# Patient Record
Sex: MALE | Race: WHITE | URBAN - METROPOLITAN AREA
[De-identification: names, ages, dates, MRNs, and addresses within clinical notes are randomized per-mention and may not be internally consistent; named-entity substitution may affect disease eponyms.]

---

## 2018-04-18 ENCOUNTER — TELEPHONE (OUTPATIENT)
Dept: PULMONOLOGY | Facility: CLINIC | Age: 48
End: 2018-04-18

## 2018-04-18 ENCOUNTER — OFFICE VISIT (OUTPATIENT)
Dept: PULMONOLOGY | Facility: CLINIC | Age: 48
End: 2018-04-18

## 2018-04-18 VITALS
TEMPERATURE: 98 F | SYSTOLIC BLOOD PRESSURE: 117 MMHG | DIASTOLIC BLOOD PRESSURE: 81 MMHG | HEART RATE: 66 BPM | OXYGEN SATURATION: 97 % | HEIGHT: 74 IN | BODY MASS INDEX: 26.18 KG/M2 | WEIGHT: 204 LBS

## 2018-04-18 DIAGNOSIS — J45.20 MILD INTERMITTENT ASTHMA WITHOUT COMPLICATION: Primary | Chronic | ICD-10-CM

## 2018-04-18 PROCEDURE — 99204 OFFICE O/P NEW MOD 45 MIN: CPT | Performed by: INTERNAL MEDICINE

## 2018-04-18 PROCEDURE — 99212 OFFICE O/P EST SF 10 MIN: CPT | Performed by: INTERNAL MEDICINE

## 2018-04-18 RX ORDER — ALBUTEROL SULFATE 90 UG/1
2 AEROSOL, METERED RESPIRATORY (INHALATION) EVERY 6 HOURS PRN
Qty: 1 INHALER | Refills: 5 | Status: SHIPPED | OUTPATIENT
Start: 2018-04-18

## 2018-04-18 NOTE — PROGRESS NOTES
Pulmonary Consult Note    HPI:   Rafaela Winter is a 57year old male with Patient presents with:  Asthma: consult ,establish care     No primary care provider on file.     Asthma  Pj Labor    Dx asthma 2 yrs  No wheeze during childhood  Dx with after \"h asthma without complication  (primary encounter diagnosis)  Pt is a large loss insurance  with dx of asthma 2 yrs ago  He has no sx but dx with asthma presumably by PFTs or arik  His only \"sx\" are his wife states he breaths heavy  He has be

## 2018-05-29 ENCOUNTER — TELEPHONE (OUTPATIENT)
Dept: PULMONOLOGY | Facility: CLINIC | Age: 48
End: 2018-05-29

## 2018-10-31 ENCOUNTER — OFFICE VISIT (OUTPATIENT)
Dept: PULMONOLOGY | Facility: CLINIC | Age: 48
End: 2018-10-31
Payer: COMMERCIAL

## 2018-10-31 VITALS
HEART RATE: 75 BPM | DIASTOLIC BLOOD PRESSURE: 89 MMHG | OXYGEN SATURATION: 99 % | RESPIRATION RATE: 18 BRPM | SYSTOLIC BLOOD PRESSURE: 131 MMHG | WEIGHT: 206 LBS | HEIGHT: 74 IN | BODY MASS INDEX: 26.44 KG/M2

## 2018-10-31 DIAGNOSIS — Z23 NEED FOR VACCINATION: Primary | ICD-10-CM

## 2018-10-31 DIAGNOSIS — J45.20 MILD INTERMITTENT ASTHMA WITHOUT COMPLICATION: Chronic | ICD-10-CM

## 2018-10-31 PROCEDURE — 90471 IMMUNIZATION ADMIN: CPT | Performed by: INTERNAL MEDICINE

## 2018-10-31 PROCEDURE — 99212 OFFICE O/P EST SF 10 MIN: CPT | Performed by: INTERNAL MEDICINE

## 2018-10-31 PROCEDURE — 99214 OFFICE O/P EST MOD 30 MIN: CPT | Performed by: INTERNAL MEDICINE

## 2018-10-31 PROCEDURE — 94010 BREATHING CAPACITY TEST: CPT | Performed by: INTERNAL MEDICINE

## 2018-10-31 PROCEDURE — 90686 IIV4 VACC NO PRSV 0.5 ML IM: CPT | Performed by: INTERNAL MEDICINE

## 2018-10-31 NOTE — PROGRESS NOTES
Pulmonary Follow Up Note    HPI:   Edd Nunez is a 57year old male with Patient presents with: Follow - Up    No primary care provider on file.     No wheeze or sob or cough  He states wife states he has \"heavy breathing\"  Still using Arunity  Has AT/NC  Anicteric  Lung CTA  CV  Reg Nl S1S2  Abd soft NT/ND  Ext No edema  Skin No rash  Psych Normal mood           PFT 2016 mod obstruction   Nl vol   Nl DLCO   _ BD    CT scan with apical scaring, airways inflammation    ASSESSMENT/PLAN:     (Z23) Need

## 2019-02-06 ENCOUNTER — OFFICE VISIT (OUTPATIENT)
Dept: PULMONOLOGY | Facility: CLINIC | Age: 49
End: 2019-02-06
Payer: COMMERCIAL

## 2019-02-06 VITALS
WEIGHT: 210 LBS | HEART RATE: 76 BPM | BODY MASS INDEX: 26.95 KG/M2 | DIASTOLIC BLOOD PRESSURE: 85 MMHG | SYSTOLIC BLOOD PRESSURE: 136 MMHG | HEIGHT: 74 IN | OXYGEN SATURATION: 100 %

## 2019-02-06 DIAGNOSIS — J45.20 MILD INTERMITTENT ASTHMA WITHOUT COMPLICATION: Primary | Chronic | ICD-10-CM

## 2019-02-06 PROCEDURE — 99213 OFFICE O/P EST LOW 20 MIN: CPT | Performed by: INTERNAL MEDICINE

## 2019-02-06 PROCEDURE — 99212 OFFICE O/P EST SF 10 MIN: CPT | Performed by: INTERNAL MEDICINE

## 2019-02-06 RX ORDER — BENZONATATE 200 MG/1
200 CAPSULE ORAL 3 TIMES DAILY PRN
Qty: 45 CAPSULE | Refills: 3 | Status: SHIPPED | OUTPATIENT
Start: 2019-02-06 | End: 2019-02-06 | Stop reason: CLARIF

## 2019-02-06 NOTE — PROGRESS NOTES
Pulmonary Follow Up Note    HPI:   Apryl Reinoso is a 54year old male with Patient presents with:  Asthma: follow up ,patientstates he is feeling well today ,denies any episodes ,has not used his inhaler since his last visit     No primary care provider fibrosis       ASSESSMENT/PLAN:     .(J45.20) Mild intermittent asthma without complication  (primary encounter diagnosis)  Doing well  Last arik with some improvement in FEV1  No sx now on prn alb MDI  Plan: cont alb MDI prn   Yearly flu vax   N95 mask a

## 2019-04-04 ENCOUNTER — OFFICE VISIT (OUTPATIENT)
Dept: INTERNAL MEDICINE CLINIC | Facility: CLINIC | Age: 49
End: 2019-04-04
Payer: COMMERCIAL

## 2019-04-04 ENCOUNTER — LAB ENCOUNTER (OUTPATIENT)
Dept: LAB | Age: 49
End: 2019-04-04
Attending: INTERNAL MEDICINE
Payer: COMMERCIAL

## 2019-04-04 VITALS
SYSTOLIC BLOOD PRESSURE: 130 MMHG | HEART RATE: 71 BPM | WEIGHT: 210 LBS | DIASTOLIC BLOOD PRESSURE: 88 MMHG | TEMPERATURE: 98 F | BODY MASS INDEX: 26.95 KG/M2 | OXYGEN SATURATION: 99 % | HEIGHT: 74.2 IN

## 2019-04-04 DIAGNOSIS — G89.29 CHRONIC RIGHT SHOULDER PAIN: ICD-10-CM

## 2019-04-04 DIAGNOSIS — M25.562 CHRONIC PAIN OF BOTH KNEES: ICD-10-CM

## 2019-04-04 DIAGNOSIS — J45.20 MILD INTERMITTENT ASTHMA WITHOUT COMPLICATION: Chronic | ICD-10-CM

## 2019-04-04 DIAGNOSIS — G89.29 CHRONIC PAIN OF BOTH KNEES: ICD-10-CM

## 2019-04-04 DIAGNOSIS — M25.511 CHRONIC RIGHT SHOULDER PAIN: ICD-10-CM

## 2019-04-04 DIAGNOSIS — Z00.00 PHYSICAL EXAM: Primary | ICD-10-CM

## 2019-04-04 DIAGNOSIS — Z00.00 PHYSICAL EXAM: ICD-10-CM

## 2019-04-04 DIAGNOSIS — M25.561 CHRONIC PAIN OF BOTH KNEES: ICD-10-CM

## 2019-04-04 PROCEDURE — 36415 COLL VENOUS BLD VENIPUNCTURE: CPT

## 2019-04-04 PROCEDURE — 80061 LIPID PANEL: CPT

## 2019-04-04 PROCEDURE — 85025 COMPLETE CBC W/AUTO DIFF WBC: CPT

## 2019-04-04 PROCEDURE — 99386 PREV VISIT NEW AGE 40-64: CPT | Performed by: INTERNAL MEDICINE

## 2019-04-04 PROCEDURE — 80053 COMPREHEN METABOLIC PANEL: CPT

## 2019-04-04 PROCEDURE — 84443 ASSAY THYROID STIM HORMONE: CPT

## 2019-04-04 NOTE — PROGRESS NOTES
HPI:    Patient ID: Angel Mcnulty is a 54year old male. Presents to office for the first time to establish care. C/o worsening bilateral knee pain  Denies any trauma  Notes occasional right shoulder pain.   Again denies any specific episodes of traum environmental allergies and food allergies. Neurological: Negative for dizziness, seizures, light-headedness and headaches. Psychiatric/Behavioral: Negative for agitation, confusion and sleep disturbance. The patient is not nervous/anxious. not cause pain. Bilateral knees do not reveal any effusion. Again he has full range of motion without any pain elicited. Posterior/anterior drawer test negative bilaterally. There does not appear to be any joint instability.    Neurological: He is tova

## 2019-08-16 ENCOUNTER — PATIENT MESSAGE (OUTPATIENT)
Dept: INTERNAL MEDICINE CLINIC | Facility: CLINIC | Age: 49
End: 2019-08-16

## 2019-08-16 DIAGNOSIS — G89.29 CHRONIC RIGHT SHOULDER PAIN: ICD-10-CM

## 2019-08-16 DIAGNOSIS — M25.561 CHRONIC PAIN OF BOTH KNEES: Primary | ICD-10-CM

## 2019-08-16 DIAGNOSIS — M25.562 CHRONIC PAIN OF BOTH KNEES: Primary | ICD-10-CM

## 2019-08-16 DIAGNOSIS — M25.511 CHRONIC RIGHT SHOULDER PAIN: ICD-10-CM

## 2019-08-16 DIAGNOSIS — G89.29 CHRONIC PAIN OF BOTH KNEES: Primary | ICD-10-CM

## 2019-08-16 NOTE — TELEPHONE ENCOUNTER
From: Vasile Trinidad  To: Garland Hodgkin, MD  Sent: 8/16/2019 2:03 PM CDT  Subject: Referral Request    Dr. Norris Files,    I need an updated referral - I had waited to school started to schedule the physical therapy and was advised the referral is no longer

## 2019-08-26 ENCOUNTER — OFFICE VISIT (OUTPATIENT)
Dept: PHYSICAL THERAPY | Age: 49
End: 2019-08-26
Attending: INTERNAL MEDICINE
Payer: COMMERCIAL

## 2019-08-26 DIAGNOSIS — G89.29 CHRONIC RIGHT SHOULDER PAIN: ICD-10-CM

## 2019-08-26 DIAGNOSIS — M25.511 CHRONIC RIGHT SHOULDER PAIN: ICD-10-CM

## 2019-08-26 DIAGNOSIS — M25.562 CHRONIC PAIN OF BOTH KNEES: ICD-10-CM

## 2019-08-26 DIAGNOSIS — G89.29 CHRONIC PAIN OF BOTH KNEES: ICD-10-CM

## 2019-08-26 DIAGNOSIS — M25.561 CHRONIC PAIN OF BOTH KNEES: ICD-10-CM

## 2019-08-26 PROCEDURE — 97110 THERAPEUTIC EXERCISES: CPT

## 2019-08-26 PROCEDURE — 97161 PT EVAL LOW COMPLEX 20 MIN: CPT

## 2019-08-26 NOTE — PROGRESS NOTES
LOWER/UPPER EXTREMITY EVALUATION:   Referring Physician: Dr. Dolly Rodriguez  Diagnosis:   Chronic pain of both knees (M25.561,M25.562,G89.29)  Chronic right shoulder pain (M25.511,G89.29)   Date of Service: 8/26/2019     PATIENT SUMMARY   Rijeremías Carmelina poole 54 Significant findings include none    ASSESSMENT  Cecily Chrales presents to physical therapy evaluation with primary c/o B knee pain and R shoulder pain (now resolved).  The results of the objective tests and measures show decreased LE flexibility, tightness in B IT Response:   Pt education was provided on exam findings, treatment diagnosis, treatment plan, expectations, and prognosis. Pt was also provided recommendations for postural corrections and ergonomics.   Patient was instructed in and issued a HEP for: [facility] [phone]    Sincerely,  Electronically signed by therapist: Chester Olmedo, PT  [de-identified] certification required: Yes  I certify the need for these services furnished under this plan of treatment and while under my care.     X_________________________

## 2019-09-04 ENCOUNTER — OFFICE VISIT (OUTPATIENT)
Dept: PHYSICAL THERAPY | Age: 49
End: 2019-09-04
Attending: INTERNAL MEDICINE
Payer: COMMERCIAL

## 2019-09-04 DIAGNOSIS — M25.511 CHRONIC RIGHT SHOULDER PAIN: ICD-10-CM

## 2019-09-04 DIAGNOSIS — M25.562 CHRONIC PAIN OF BOTH KNEES: ICD-10-CM

## 2019-09-04 DIAGNOSIS — G89.29 CHRONIC RIGHT SHOULDER PAIN: ICD-10-CM

## 2019-09-04 DIAGNOSIS — M25.561 CHRONIC PAIN OF BOTH KNEES: ICD-10-CM

## 2019-09-04 DIAGNOSIS — G89.29 CHRONIC PAIN OF BOTH KNEES: ICD-10-CM

## 2019-09-04 PROCEDURE — 97140 MANUAL THERAPY 1/> REGIONS: CPT

## 2019-09-04 PROCEDURE — 97110 THERAPEUTIC EXERCISES: CPT

## 2019-09-11 ENCOUNTER — OFFICE VISIT (OUTPATIENT)
Dept: PHYSICAL THERAPY | Age: 49
End: 2019-09-11
Attending: INTERNAL MEDICINE
Payer: COMMERCIAL

## 2019-09-11 DIAGNOSIS — M25.562 CHRONIC PAIN OF BOTH KNEES: ICD-10-CM

## 2019-09-11 DIAGNOSIS — G89.29 CHRONIC RIGHT SHOULDER PAIN: ICD-10-CM

## 2019-09-11 DIAGNOSIS — M25.511 CHRONIC RIGHT SHOULDER PAIN: ICD-10-CM

## 2019-09-11 DIAGNOSIS — G89.29 CHRONIC PAIN OF BOTH KNEES: ICD-10-CM

## 2019-09-11 DIAGNOSIS — M25.561 CHRONIC PAIN OF BOTH KNEES: ICD-10-CM

## 2019-09-11 PROCEDURE — 97140 MANUAL THERAPY 1/> REGIONS: CPT

## 2019-09-11 PROCEDURE — 97110 THERAPEUTIC EXERCISES: CPT

## 2019-09-11 NOTE — PROGRESS NOTES
Dx:    Chronic pain of both knees (M25.561,M25.562,G89.29)  Chronic right shoulder pain (M25.511,G89.29)      Insurance (Authorized # of Visits): Geraldine Joseph Physician: Dr. Tanesha Crews MD visit: none scheduled  Fall Risk: standard         Pre on step 2x10      Self care Home management:       HEP: gastroc/soleus stretch    Charges: 1MAN,2TE      Total Timed Treatment: 45 min  Total Treatment Time: 46 min

## 2019-09-25 ENCOUNTER — OFFICE VISIT (OUTPATIENT)
Dept: PHYSICAL THERAPY | Age: 49
End: 2019-09-25
Attending: INTERNAL MEDICINE
Payer: COMMERCIAL

## 2019-09-25 PROCEDURE — 97140 MANUAL THERAPY 1/> REGIONS: CPT

## 2019-09-25 PROCEDURE — 97110 THERAPEUTIC EXERCISES: CPT

## 2019-09-25 NOTE — PROGRESS NOTES
Dx:    Chronic pain of both knees (M25.561,M25.562,G89.29)  Chronic right shoulder pain (M25.511,G89.29)      Insurance (Authorized # of Visits): Vonnie Benoit Physician: Dr. Mars BUNCH visit: none scheduled  Fall Risk: standard         Precaution 2x30s  Foam roll 20x ITB B  SL HR 2x 10  SL bridge 2 x 10     Manual:  DTM to B ITB Manual:  DTM to B ITB Manual:  DTM to B ITB     NeuroMuscular Rodolfo  Dips on step 2x10 NeuroMuscular Rodolfo  Dips on step 2x10 NeuroMuscular Rodolfo  Dips on step 2x10     Self c

## 2019-10-09 ENCOUNTER — OFFICE VISIT (OUTPATIENT)
Dept: PHYSICAL THERAPY | Age: 49
End: 2019-10-09
Attending: INTERNAL MEDICINE
Payer: COMMERCIAL

## 2019-10-09 PROCEDURE — 97140 MANUAL THERAPY 1/> REGIONS: CPT

## 2019-10-09 PROCEDURE — 97110 THERAPEUTIC EXERCISES: CPT

## 2019-10-09 NOTE — PROGRESS NOTES
Dx:    Chronic pain of both knees (M25.561,M25.562,G89.29)  Chronic right shoulder pain (M25.511,G89.29)      Insurance (Authorized # of Visits): Doc Collazo Physician: Dr. Mars BUNCH visit: none scheduled  Fall Risk: standard         Precaution 2x30s  Soleus stretch incline 2x30s  Foam roll 20x ITB B  SL HR 2x 10  SL bridge 2 x 10 TherEx:  Hamstring stretch with strap 3x30s  Piriformis [id number]  Quad stretch prone 3x30s  gastroc stretch incline 2x30s  Soleus stretch incline 2x30s  Foam roll 2

## 2019-10-18 ENCOUNTER — OFFICE VISIT (OUTPATIENT)
Dept: PHYSICAL THERAPY | Age: 49
End: 2019-10-18
Attending: INTERNAL MEDICINE
Payer: COMMERCIAL

## 2019-10-18 PROCEDURE — 97140 MANUAL THERAPY 1/> REGIONS: CPT | Performed by: PHYSICAL THERAPIST

## 2019-10-18 PROCEDURE — 97110 THERAPEUTIC EXERCISES: CPT | Performed by: PHYSICAL THERAPIST

## 2019-10-18 PROCEDURE — 97530 THERAPEUTIC ACTIVITIES: CPT | Performed by: PHYSICAL THERAPIST

## 2019-10-18 NOTE — PROGRESS NOTES
Dx:    Chronic pain of both knees (M25.561,M25.562,G89.29)  Chronic right shoulder pain (M25.511,G89.29)      Insurance (Authorized # of Visits): [facility]    10  Authorizing Physician: Dr. Mars BUNCH visit: none scheduled  Fall Risk: standard         Precautio with strap 3x30s  Piriformis [id number]  Quad stretch prone 3x30s  gastroc stretch incline 2x30s  Soleus stretch incline 2x30s     TherEx:  Hamstring stretch with strap 3x30s  Piriformis [id number]  Quad stretch prone 3x30s  gastroc stretch incline 2x3

## 2019-10-25 ENCOUNTER — OFFICE VISIT (OUTPATIENT)
Dept: PHYSICAL THERAPY | Age: 49
End: 2019-10-25
Attending: INTERNAL MEDICINE
Payer: COMMERCIAL

## 2019-10-25 PROCEDURE — 97112 NEUROMUSCULAR REEDUCATION: CPT | Performed by: PHYSICAL THERAPIST

## 2019-10-25 PROCEDURE — 97110 THERAPEUTIC EXERCISES: CPT | Performed by: PHYSICAL THERAPIST

## 2019-10-25 NOTE — PROGRESS NOTES
Dx:    Chronic pain of both knees (M25.561,M25.562,G89.29)  Chronic right shoulder pain (M25.511,G89.29)      Insurance (Authorized # of Visits): [employer] Tracey O    10  Authorizing Physician: Dr. Mars BUNCH visit: none scheduled  Fall Risk: standard         Precautio 3x30s  Piriformis [id number]  Quad stretch prone 3x30s  gastroc stretch incline 2x30s  Soleus stretch incline 2x30s     TherEx:  Hamstring stretch with strap 3x30s  Piriformis [id number]  Quad stretch prone 3x30s  gastroc stretch incline 2x30s  Soleus 10 reps, mirror for visual cue     Self care Home management:    - -   HEP: s/l hip abduction/squares 2 x 5 reps each, Bridges c alternate knee ext x 10 reps 2 sets, gastroc stretch L/R upon awakening. Add: squats c GTB, standing quad stretches.  Complete a

## 2019-10-30 ENCOUNTER — TELEPHONE (OUTPATIENT)
Dept: PHYSICAL THERAPY | Age: 49
End: 2019-10-30

## 2019-11-01 ENCOUNTER — APPOINTMENT (OUTPATIENT)
Dept: PHYSICAL THERAPY | Age: 49
End: 2019-11-01
Attending: INTERNAL MEDICINE
Payer: COMMERCIAL

## 2019-11-06 ENCOUNTER — APPOINTMENT (OUTPATIENT)
Dept: PHYSICAL THERAPY | Age: 49
End: 2019-11-06
Attending: INTERNAL MEDICINE
Payer: COMMERCIAL

## 2019-12-20 ENCOUNTER — OFFICE VISIT (OUTPATIENT)
Dept: INTERNAL MEDICINE CLINIC | Facility: CLINIC | Age: 49
End: 2019-12-20
Payer: COMMERCIAL

## 2019-12-20 VITALS
OXYGEN SATURATION: 99 % | HEART RATE: 66 BPM | HEIGHT: 74.2 IN | DIASTOLIC BLOOD PRESSURE: 88 MMHG | SYSTOLIC BLOOD PRESSURE: 122 MMHG | BODY MASS INDEX: 28.23 KG/M2 | TEMPERATURE: 97 F | WEIGHT: 220 LBS

## 2019-12-20 DIAGNOSIS — J45.20 MILD INTERMITTENT ASTHMA WITHOUT COMPLICATION: Chronic | ICD-10-CM

## 2019-12-20 DIAGNOSIS — M25.511 CHRONIC RIGHT SHOULDER PAIN: ICD-10-CM

## 2019-12-20 DIAGNOSIS — G89.29 CHRONIC RIGHT SHOULDER PAIN: ICD-10-CM

## 2019-12-20 DIAGNOSIS — M72.2 PLANTAR FASCIITIS: Primary | ICD-10-CM

## 2019-12-20 PROCEDURE — 90686 IIV4 VACC NO PRSV 0.5 ML IM: CPT | Performed by: INTERNAL MEDICINE

## 2019-12-20 PROCEDURE — 90471 IMMUNIZATION ADMIN: CPT | Performed by: INTERNAL MEDICINE

## 2019-12-20 PROCEDURE — 99213 OFFICE O/P EST LOW 20 MIN: CPT | Performed by: INTERNAL MEDICINE

## 2019-12-20 NOTE — PROGRESS NOTES
HPI:    Patient ID: Heather Ghosh is a 54year old male. Patient has been attending physical therapy for management of chronic knee pain. He has noted significant improvement and has been very happy with results.     However over the last several week ASSESSMENT/PLAN:   Plantar fasciitis  (primary encounter diagnosis)  Mild intermittent asthma without complication  Chronic right shoulder pain    Patient has symptoms consistent of plantar fasciitis. I recommended stretching exercises.   Also recommende

## 2019-12-31 ENCOUNTER — APPOINTMENT (OUTPATIENT)
Dept: PHYSICAL THERAPY | Age: 49
End: 2019-12-31
Attending: INTERNAL MEDICINE
Payer: COMMERCIAL

## 2020-01-08 ENCOUNTER — OFFICE VISIT (OUTPATIENT)
Dept: PHYSICAL THERAPY | Age: 50
End: 2020-01-08
Attending: INTERNAL MEDICINE
Payer: COMMERCIAL

## 2020-01-08 PROCEDURE — 97162 PT EVAL MOD COMPLEX 30 MIN: CPT

## 2020-01-08 PROCEDURE — 97110 THERAPEUTIC EXERCISES: CPT

## 2020-01-08 NOTE — PROGRESS NOTES
SHOULDER EVALUATION:   Referring Physician: Dr. Yoli Zimmerman  Diagnosis: Chronic right shoulder pain (M25.511,G89.29)      Date of Service: 1/8/2020     PATIENT SUMMARY   Hannah Peace is a 54year old y/o male who presents to therapy today with complaints of and symptoms are consistent with diagnosis of R shoulder internal derangement versus bicep tendonosis. Pt and PT discussed evaluation findings, pathology, POC and HEP. Pt voiced understanding and performs HEP correctly without reported pain.  Skilled Physi including changing pain levels.   PLAN OF CARE:    Goals: (to be met in 8-10 visits)     · Pt will improve R shoulder flexion AROM to >172 degrees to be able to reach into overhead cabinets without pain or restriction   · Pt will increase R shoulder AROM IR

## 2020-01-15 ENCOUNTER — APPOINTMENT (OUTPATIENT)
Dept: PHYSICAL THERAPY | Age: 50
End: 2020-01-15
Attending: INTERNAL MEDICINE
Payer: COMMERCIAL

## 2020-01-22 ENCOUNTER — TELEPHONE (OUTPATIENT)
Dept: PHYSICAL THERAPY | Age: 50
End: 2020-01-22

## 2020-01-23 ENCOUNTER — APPOINTMENT (OUTPATIENT)
Dept: PHYSICAL THERAPY | Age: 50
End: 2020-01-23
Attending: INTERNAL MEDICINE
Payer: COMMERCIAL

## 2020-01-30 ENCOUNTER — APPOINTMENT (OUTPATIENT)
Dept: PHYSICAL THERAPY | Age: 50
End: 2020-01-30
Attending: INTERNAL MEDICINE
Payer: COMMERCIAL

## 2020-02-05 ENCOUNTER — APPOINTMENT (OUTPATIENT)
Dept: PHYSICAL THERAPY | Age: 50
End: 2020-02-05
Attending: INTERNAL MEDICINE
Payer: COMMERCIAL

## 2020-02-07 ENCOUNTER — APPOINTMENT (OUTPATIENT)
Dept: PHYSICAL THERAPY | Age: 50
End: 2020-02-07
Attending: INTERNAL MEDICINE
Payer: COMMERCIAL

## 2020-04-03 ENCOUNTER — TELEPHONE (OUTPATIENT)
Dept: PULMONOLOGY | Facility: CLINIC | Age: 50
End: 2020-04-03

## 2020-04-03 NOTE — TELEPHONE ENCOUNTER
Called pt to offer phone visit or reschedule upcoming appt with . Pt rescheduled ольга. on 06/11/2020 at 2:30. Pt voiced understanding and denied any further questions at this time.

## 2020-06-11 ENCOUNTER — OFFICE VISIT (OUTPATIENT)
Dept: PULMONOLOGY | Facility: CLINIC | Age: 50
End: 2020-06-11
Payer: COMMERCIAL

## 2020-06-11 VITALS
OXYGEN SATURATION: 97 % | DIASTOLIC BLOOD PRESSURE: 83 MMHG | HEIGHT: 74.2 IN | RESPIRATION RATE: 18 BRPM | BODY MASS INDEX: 27.46 KG/M2 | SYSTOLIC BLOOD PRESSURE: 138 MMHG | HEART RATE: 79 BPM | WEIGHT: 214 LBS

## 2020-06-11 DIAGNOSIS — J45.20 MILD INTERMITTENT ASTHMA WITHOUT COMPLICATION: Primary | Chronic | ICD-10-CM

## 2020-06-11 PROCEDURE — 99213 OFFICE O/P EST LOW 20 MIN: CPT | Performed by: INTERNAL MEDICINE

## 2020-06-11 PROCEDURE — 94010 BREATHING CAPACITY TEST: CPT | Performed by: INTERNAL MEDICINE

## 2020-06-11 NOTE — PROGRESS NOTES
Pulmonary Follow Up Note    HPI:   Kevin Rendon is a 52year old male with Patient presents with: Follow - Up:  Annual f/u    Dorian Garcia MD    Rescheduled x 3    H/o asthma  Dong well    Meds  Not needed alb PRN  Off maintaince meds x 12 + months without complication  (primary encounter diagnosis)  Doing well  No acute exacerbations  Does not need alb at all  No issues since stopping maintainecne Rx > 12 months ago  Plan:     Sherry De Oliveira but not working and ?  New policy preventing arik  Prn alb  F/u 1 y

## 2020-09-02 ENCOUNTER — TELEPHONE (OUTPATIENT)
Dept: INTERNAL MEDICINE CLINIC | Facility: CLINIC | Age: 50
End: 2020-09-02

## 2020-09-02 NOTE — TELEPHONE ENCOUNTER
- Patient is scheduled for MyChart exam and needs a physical. Can this be changed to appropriate time slot? Thank you.

## 2020-09-04 ENCOUNTER — OFFICE VISIT (OUTPATIENT)
Dept: INTERNAL MEDICINE CLINIC | Facility: CLINIC | Age: 50
End: 2020-09-04
Payer: COMMERCIAL

## 2020-09-04 VITALS
TEMPERATURE: 98 F | WEIGHT: 218.19 LBS | HEIGHT: 74.2 IN | DIASTOLIC BLOOD PRESSURE: 88 MMHG | SYSTOLIC BLOOD PRESSURE: 130 MMHG | BODY MASS INDEX: 28 KG/M2 | HEART RATE: 74 BPM | OXYGEN SATURATION: 99 %

## 2020-09-04 DIAGNOSIS — I83.891 VARICOSE VEINS OF RIGHT LOWER EXTREMITY WITH OTHER COMPLICATIONS: Primary | ICD-10-CM

## 2020-09-04 DIAGNOSIS — J45.20 MILD INTERMITTENT ASTHMA WITHOUT COMPLICATION: Chronic | ICD-10-CM

## 2020-09-04 PROCEDURE — 3079F DIAST BP 80-89 MM HG: CPT | Performed by: INTERNAL MEDICINE

## 2020-09-04 PROCEDURE — 3075F SYST BP GE 130 - 139MM HG: CPT | Performed by: INTERNAL MEDICINE

## 2020-09-04 PROCEDURE — 99213 OFFICE O/P EST LOW 20 MIN: CPT | Performed by: INTERNAL MEDICINE

## 2020-09-04 PROCEDURE — 3008F BODY MASS INDEX DOCD: CPT | Performed by: INTERNAL MEDICINE

## 2020-09-04 NOTE — PROGRESS NOTES
HPI:    Patient ID: Belle Sewell is a 52year old male. Patient presents with complaints of bulging vein right lower extremity. He has no pain but feels that it is increasing in size.     His asthma has been under good control and he does not need to surgery for further evaluation/management. Asthma has been under good control--patient continues to follow-up with pulmonologist    Patient reminded to get seasonal flu vaccine when available.     He will follow-up after vascular surgery evaluation  No o

## 2020-09-29 ENCOUNTER — MED REC SCAN ONLY (OUTPATIENT)
Dept: INTERNAL MEDICINE CLINIC | Facility: CLINIC | Age: 50
End: 2020-09-29

## 2021-06-16 ENCOUNTER — OFFICE VISIT (OUTPATIENT)
Dept: PULMONOLOGY | Facility: CLINIC | Age: 51
End: 2021-06-16
Payer: COMMERCIAL

## 2021-06-16 VITALS
DIASTOLIC BLOOD PRESSURE: 97 MMHG | HEIGHT: 74 IN | BODY MASS INDEX: 27.34 KG/M2 | SYSTOLIC BLOOD PRESSURE: 150 MMHG | HEART RATE: 79 BPM | RESPIRATION RATE: 18 BRPM | OXYGEN SATURATION: 99 % | WEIGHT: 213 LBS

## 2021-06-16 DIAGNOSIS — J45.20 MILD INTERMITTENT ASTHMA WITHOUT COMPLICATION: Primary | ICD-10-CM

## 2021-06-16 DIAGNOSIS — I83.93 ASYMPTOMATIC VARICOSE VEINS OF BOTH LOWER EXTREMITIES: ICD-10-CM

## 2021-06-16 PROCEDURE — 99213 OFFICE O/P EST LOW 20 MIN: CPT | Performed by: INTERNAL MEDICINE

## 2021-06-16 PROCEDURE — 3077F SYST BP >= 140 MM HG: CPT | Performed by: INTERNAL MEDICINE

## 2021-06-16 PROCEDURE — 3080F DIAST BP >= 90 MM HG: CPT | Performed by: INTERNAL MEDICINE

## 2021-06-16 PROCEDURE — 3008F BODY MASS INDEX DOCD: CPT | Performed by: INTERNAL MEDICINE

## 2021-06-16 NOTE — PROGRESS NOTES
Pulmonary Follow Up Note    HPI:   Tommy Buck is a 54year old male with Patient presents with: Follow - Up:  Annual check up    Claire Aldana MD    Feeling well  No wheeze  No cough  No sob  Has not used alb in 12 months    New filtered mask for f sounds  Ext no edema  MSK No cyanosis, No clubbing, No joint deformity in the b/l UE/LE, no lacerations  Skin Normal inspection and palpation without acute appearing rash  Psych Normal mood, Insight and judgement appropriate  Lymph no cervical JOEL

## 2021-12-06 ENCOUNTER — MED REC SCAN ONLY (OUTPATIENT)
Dept: INTERNAL MEDICINE CLINIC | Facility: CLINIC | Age: 51
End: 2021-12-06

## 2022-03-11 ENCOUNTER — LAB ENCOUNTER (OUTPATIENT)
Dept: LAB | Age: 52
End: 2022-03-11
Attending: INTERNAL MEDICINE
Payer: COMMERCIAL

## 2022-03-11 ENCOUNTER — OFFICE VISIT (OUTPATIENT)
Dept: INTERNAL MEDICINE CLINIC | Facility: CLINIC | Age: 52
End: 2022-03-11
Payer: COMMERCIAL

## 2022-03-11 VITALS
HEIGHT: 74 IN | OXYGEN SATURATION: 99 % | HEART RATE: 88 BPM | TEMPERATURE: 98 F | WEIGHT: 207.38 LBS | DIASTOLIC BLOOD PRESSURE: 84 MMHG | SYSTOLIC BLOOD PRESSURE: 116 MMHG | BODY MASS INDEX: 26.62 KG/M2

## 2022-03-11 DIAGNOSIS — J45.20 MILD INTERMITTENT ASTHMA WITHOUT COMPLICATION: Chronic | ICD-10-CM

## 2022-03-11 DIAGNOSIS — Z00.00 PHYSICAL EXAM: ICD-10-CM

## 2022-03-11 DIAGNOSIS — I83.90 VARICOSE VEINS OF LOWER EXTREMITY, UNSPECIFIED LATERALITY, UNSPECIFIED WHETHER COMPLICATED: ICD-10-CM

## 2022-03-11 DIAGNOSIS — Z00.00 PHYSICAL EXAM: Primary | ICD-10-CM

## 2022-03-11 LAB
ALBUMIN SERPL-MCNC: 4.3 G/DL (ref 3.4–5)
ALBUMIN/GLOB SERPL: 1.5 {RATIO} (ref 1–2)
ALP LIVER SERPL-CCNC: 74 U/L
ALT SERPL-CCNC: 24 U/L
ANION GAP SERPL CALC-SCNC: 4 MMOL/L (ref 0–18)
AST SERPL-CCNC: 19 U/L (ref 15–37)
BASOPHILS # BLD AUTO: 0.07 X10(3) UL (ref 0–0.2)
BASOPHILS NFR BLD AUTO: 1 %
BILIRUB SERPL-MCNC: 0.7 MG/DL (ref 0.1–2)
BILIRUB UR QL: NEGATIVE
BUN BLD-MCNC: 17 MG/DL (ref 7–18)
BUN/CREAT SERPL: 15.5 (ref 10–20)
CALCIUM BLD-MCNC: 9.5 MG/DL (ref 8.5–10.1)
CHLORIDE SERPL-SCNC: 105 MMOL/L (ref 98–112)
CHOLEST SERPL-MCNC: 198 MG/DL (ref ?–200)
CLARITY UR: CLEAR
CO2 SERPL-SCNC: 29 MMOL/L (ref 21–32)
COLOR UR: YELLOW
COMPLEXED PSA SERPL-MCNC: 1.36 NG/ML (ref ?–4)
CREAT BLD-MCNC: 1.1 MG/DL
DEPRECATED RDW RBC AUTO: 37.4 FL (ref 35.1–46.3)
EOSINOPHIL # BLD AUTO: 0.13 X10(3) UL (ref 0–0.7)
EOSINOPHIL NFR BLD AUTO: 1.9 %
ERYTHROCYTE [DISTWIDTH] IN BLOOD BY AUTOMATED COUNT: 11.9 % (ref 11–15)
EST. AVERAGE GLUCOSE BLD GHB EST-MCNC: 105 MG/DL (ref 68–126)
FASTING PATIENT LIPID ANSWER: NO
FASTING STATUS PATIENT QL REPORTED: NO
GLOBULIN PLAS-MCNC: 2.8 G/DL (ref 2.8–4.4)
GLUCOSE BLD-MCNC: 91 MG/DL (ref 70–99)
GLUCOSE UR-MCNC: NEGATIVE MG/DL
HBA1C MFR BLD: 5.3 % (ref ?–5.7)
HCT VFR BLD AUTO: 44.6 %
HDLC SERPL-MCNC: 49 MG/DL (ref 40–59)
HGB BLD-MCNC: 14.8 G/DL
HGB UR QL STRIP.AUTO: NEGATIVE
IMM GRANULOCYTES # BLD AUTO: 0.01 X10(3) UL (ref 0–1)
IMM GRANULOCYTES NFR BLD: 0.1 %
KETONES UR-MCNC: NEGATIVE MG/DL
LDLC SERPL CALC-MCNC: 129 MG/DL (ref ?–100)
LEUKOCYTE ESTERASE UR QL STRIP.AUTO: NEGATIVE
LYMPHOCYTES # BLD AUTO: 2.07 X10(3) UL (ref 1–4)
LYMPHOCYTES NFR BLD AUTO: 30.4 %
MCH RBC QN AUTO: 28.6 PG (ref 26–34)
MCHC RBC AUTO-ENTMCNC: 33.2 G/DL (ref 31–37)
MONOCYTES # BLD AUTO: 0.79 X10(3) UL (ref 0.1–1)
MONOCYTES NFR BLD AUTO: 11.6 %
NEUTROPHILS # BLD AUTO: 3.73 X10 (3) UL (ref 1.5–7.7)
NEUTROPHILS # BLD AUTO: 3.73 X10(3) UL (ref 1.5–7.7)
NEUTROPHILS NFR BLD AUTO: 55 %
NITRITE UR QL STRIP.AUTO: NEGATIVE
NONHDLC SERPL-MCNC: 149 MG/DL (ref ?–130)
OSMOLALITY SERPL CALC.SUM OF ELEC: 287 MOSM/KG (ref 275–295)
PH UR: 5 [PH] (ref 5–8)
PLATELET # BLD AUTO: 241 10(3)UL (ref 150–450)
POTASSIUM SERPL-SCNC: 4.5 MMOL/L (ref 3.5–5.1)
PROT SERPL-MCNC: 7.1 G/DL (ref 6.4–8.2)
PROT UR-MCNC: NEGATIVE MG/DL
RBC # BLD AUTO: 5.17 X10(6)UL
SODIUM SERPL-SCNC: 138 MMOL/L (ref 136–145)
SP GR UR STRIP: 1.02 (ref 1–1.03)
TRIGL SERPL-MCNC: 113 MG/DL (ref 30–149)
TSI SER-ACNC: 1.69 MIU/ML (ref 0.36–3.74)
UROBILINOGEN UR STRIP-ACNC: <2
VIT C UR-MCNC: NEGATIVE MG/DL
VLDLC SERPL CALC-MCNC: 20 MG/DL (ref 0–30)
WBC # BLD AUTO: 6.8 X10(3) UL (ref 4–11)

## 2022-03-11 PROCEDURE — 83036 HEMOGLOBIN GLYCOSYLATED A1C: CPT

## 2022-03-11 PROCEDURE — 99396 PREV VISIT EST AGE 40-64: CPT | Performed by: INTERNAL MEDICINE

## 2022-03-11 PROCEDURE — 3008F BODY MASS INDEX DOCD: CPT | Performed by: INTERNAL MEDICINE

## 2022-03-11 PROCEDURE — 84443 ASSAY THYROID STIM HORMONE: CPT

## 2022-03-11 PROCEDURE — 80061 LIPID PANEL: CPT

## 2022-03-11 PROCEDURE — 36415 COLL VENOUS BLD VENIPUNCTURE: CPT

## 2022-03-11 PROCEDURE — 80053 COMPREHEN METABOLIC PANEL: CPT

## 2022-03-11 PROCEDURE — 3079F DIAST BP 80-89 MM HG: CPT | Performed by: INTERNAL MEDICINE

## 2022-03-11 PROCEDURE — 81003 URINALYSIS AUTO W/O SCOPE: CPT | Performed by: INTERNAL MEDICINE

## 2022-03-11 PROCEDURE — 85025 COMPLETE CBC W/AUTO DIFF WBC: CPT

## 2022-03-11 PROCEDURE — 3074F SYST BP LT 130 MM HG: CPT | Performed by: INTERNAL MEDICINE

## 2022-05-10 ENCOUNTER — TELEPHONE (OUTPATIENT)
Dept: GASTROENTEROLOGY | Facility: CLINIC | Age: 52
End: 2022-05-10

## 2022-05-10 ENCOUNTER — OFFICE VISIT (OUTPATIENT)
Dept: GASTROENTEROLOGY | Facility: CLINIC | Age: 52
End: 2022-05-10
Payer: COMMERCIAL

## 2022-05-10 VITALS
WEIGHT: 218 LBS | HEART RATE: 65 BPM | HEIGHT: 74 IN | SYSTOLIC BLOOD PRESSURE: 135 MMHG | DIASTOLIC BLOOD PRESSURE: 91 MMHG | BODY MASS INDEX: 27.98 KG/M2

## 2022-05-10 DIAGNOSIS — Z12.11 COLON CANCER SCREENING: Primary | ICD-10-CM

## 2022-05-10 PROCEDURE — 3080F DIAST BP >= 90 MM HG: CPT | Performed by: NURSE PRACTITIONER

## 2022-05-10 PROCEDURE — 3008F BODY MASS INDEX DOCD: CPT | Performed by: NURSE PRACTITIONER

## 2022-05-10 PROCEDURE — 3075F SYST BP GE 130 - 139MM HG: CPT | Performed by: NURSE PRACTITIONER

## 2022-05-10 PROCEDURE — 99243 OFF/OP CNSLTJ NEW/EST LOW 30: CPT | Performed by: NURSE PRACTITIONER

## 2022-05-10 NOTE — TELEPHONE ENCOUNTER
Scheduled for:  Colonoscopy 25915  Provider Name:  Dr Lisa Oviedo  Date:  06/08/2022  [facility]  Sedation:  IV  Time:  2:30 (pt is aware to arrive at 1:30PM)  Prep:  Colyte  Meds/Allergies Reconciled?:  Yadira/NP reviewed. Diagnosis with codes:  CCS Z12.11  Was patient informed to call insurance with codes (Y/N):  Y     Referral sent?:  NA  [facility] or [address] [place] [address] notified?:  I sent an electronic request to Endo Scheduling and received a confirmation today. Medication Orders:  Pt is aware to NOT take iron pills, herbal meds and diet supplements for 7 days before exam. Also to NOT take any form of alcohol, recreational drugs and any forms of ED meds 24 hours before exam.     Misc Orders:       Further instructions given by staff:  I discussed the prep intructions with the patient in office which he verbally understood. Copy of instructions was handed to patient as well. Patient was also advised he will receive a call from PAT nurse 72-24 hours prior procedure to schedule Covid test done.

## 2022-05-10 NOTE — PATIENT INSTRUCTIONS
1. Schedule colonoscopy with [facility] w/ Dr. Tony Peña  [Diagnosis: crc screening]    2.  bowel prep from pharmacy (split trilyte)    3. Continue all medications for procedure    4. Read all bowel prep instructions carefully    5. AVOID seeds, nuts, popcorn, raw fruits and vegetables (cooked is okay) for 2-3 days before procedure    6. You will need to be tested for COVID within 72 hours of your procedure. You will be contacted with instructions on how to do this.       >>>Please note: if you were prescribed Suprep for the bowel prep and it is too expensive or not covered by insurance, it is okay to substitute Trilyte (or any similar generic prep). This can be done by notifying the pharmacy or calling our office.

## 2022-06-06 ENCOUNTER — TELEPHONE (OUTPATIENT)
Dept: GASTROENTEROLOGY | Facility: CLINIC | Age: 52
End: 2022-06-06

## 2022-06-06 RX ORDER — POLYETHYLENE GLYCOL 3350, SODIUM CHLORIDE, SODIUM BICARBONATE, POTASSIUM CHLORIDE 420; 11.2; 5.72; 1.48 G/4L; G/4L; G/4L; G/4L
4000 POWDER, FOR SOLUTION ORAL AS DIRECTED
Qty: 4000 ML | Refills: 0 | Status: SHIPPED | OUTPATIENT
Start: 2022-06-06

## 2022-06-06 NOTE — TELEPHONE ENCOUNTER
Dr. Merrick Luciano    Patient has procedure scheduled on 6/8/2022 and no order for prep at pharmacy. Please review and sign below pended order for prep if appropriate.     Thank you

## 2022-06-06 NOTE — TELEPHONE ENCOUNTER
Patient has colonoscopy on 06/08 and has not received prep. Please call to update at [phone],[phone].

## 2022-07-20 ENCOUNTER — HOSPITAL ENCOUNTER (OUTPATIENT)
Facility: HOSPITAL | Age: 52
Setting detail: HOSPITAL OUTPATIENT SURGERY
Discharge: HOME | End: 2022-07-20
Attending: INTERNAL MEDICINE | Admitting: INTERNAL MEDICINE
Payer: COMMERCIAL

## 2022-07-20 VITALS
BODY MASS INDEX: 26.31 KG/M2 | SYSTOLIC BLOOD PRESSURE: 133 MMHG | DIASTOLIC BLOOD PRESSURE: 94 MMHG | HEIGHT: 74 IN | HEART RATE: 68 BPM | OXYGEN SATURATION: 94 % | WEIGHT: 205 LBS | RESPIRATION RATE: 11 BRPM

## 2022-07-20 DIAGNOSIS — Z12.11 SCREENING FOR COLON CANCER: ICD-10-CM

## 2022-07-20 PROCEDURE — G0500 MOD SEDAT ENDO SERVICE >5YRS: HCPCS | Performed by: INTERNAL MEDICINE

## 2022-07-20 PROCEDURE — 45385 COLONOSCOPY W/LESION REMOVAL: CPT | Performed by: INTERNAL MEDICINE

## 2022-07-20 PROCEDURE — 0DBK8ZX EXCISION OF ASCENDING COLON, VIA NATURAL OR ARTIFICIAL OPENING ENDOSCOPIC, DIAGNOSTIC: ICD-10-PCS | Performed by: INTERNAL MEDICINE

## 2022-07-20 RX ORDER — MIDAZOLAM HYDROCHLORIDE 1 MG/ML
INJECTION INTRAMUSCULAR; INTRAVENOUS
Status: DISCONTINUED | OUTPATIENT
Start: 2022-07-20 | End: 2022-07-20

## 2022-07-20 RX ORDER — SODIUM CHLORIDE 0.9 % (FLUSH) 0.9 %
10 SYRINGE (ML) INJECTION AS NEEDED
Status: DISCONTINUED | OUTPATIENT
Start: 2022-07-20 | End: 2022-07-20

## 2022-07-20 RX ORDER — MIDAZOLAM HYDROCHLORIDE 1 MG/ML
1 INJECTION INTRAMUSCULAR; INTRAVENOUS EVERY 5 MIN PRN
Status: DISCONTINUED | OUTPATIENT
Start: 2022-07-20 | End: 2022-07-20

## 2022-07-20 RX ORDER — SODIUM CHLORIDE, SODIUM LACTATE, POTASSIUM CHLORIDE, CALCIUM CHLORIDE 600; 310; 30; 20 MG/100ML; MG/100ML; MG/100ML; MG/100ML
INJECTION, SOLUTION INTRAVENOUS CONTINUOUS
Status: DISCONTINUED | OUTPATIENT
Start: 2022-07-20 | End: 2022-07-20

## 2022-08-03 ENCOUNTER — TELEPHONE (OUTPATIENT)
Dept: GASTROENTEROLOGY | Facility: CLINIC | Age: 52
End: 2022-08-03

## 2022-08-03 NOTE — TELEPHONE ENCOUNTER
I mailed out colonoscopy results letter to pt  Updated health maintenance  Entered into 7 yr CLN recall  Recall colon in 7 years per. Colon done 7/20/2022    Keyla Blake MD  [facility] Clinical Staff  GI staff: please place recall for colonoscopy in 7 years.

## 2022-08-12 ENCOUNTER — PATIENT MESSAGE (OUTPATIENT)
Dept: INTERNAL MEDICINE CLINIC | Facility: CLINIC | Age: 52
End: 2022-08-12

## 2022-08-15 ENCOUNTER — TELEPHONE (OUTPATIENT)
Dept: INTERNAL MEDICINE CLINIC | Facility: CLINIC | Age: 52
End: 2022-08-15

## 2022-08-17 ENCOUNTER — OFFICE VISIT (OUTPATIENT)
Dept: INTERNAL MEDICINE CLINIC | Facility: CLINIC | Age: 52
End: 2022-08-17
Payer: COMMERCIAL

## 2022-08-17 VITALS
WEIGHT: 208 LBS | HEART RATE: 82 BPM | SYSTOLIC BLOOD PRESSURE: 110 MMHG | HEIGHT: 74 IN | OXYGEN SATURATION: 100 % | DIASTOLIC BLOOD PRESSURE: 82 MMHG | BODY MASS INDEX: 26.69 KG/M2

## 2022-08-17 DIAGNOSIS — G56.21 ULNAR NEUROPATHY AT ELBOW, RIGHT: Primary | ICD-10-CM

## 2022-08-17 PROCEDURE — 99213 OFFICE O/P EST LOW 20 MIN: CPT | Performed by: INTERNAL MEDICINE

## 2022-08-17 PROCEDURE — 3079F DIAST BP 80-89 MM HG: CPT | Performed by: INTERNAL MEDICINE

## 2022-08-17 PROCEDURE — 3074F SYST BP LT 130 MM HG: CPT | Performed by: INTERNAL MEDICINE

## 2022-08-17 PROCEDURE — 3008F BODY MASS INDEX DOCD: CPT | Performed by: INTERNAL MEDICINE

## 2022-08-17 RX ORDER — MELOXICAM 15 MG/1
15 TABLET ORAL DAILY
Qty: 30 TABLET | Refills: 0 | Status: SHIPPED | OUTPATIENT
Start: 2022-08-17

## 2022-08-31 ENCOUNTER — TELEPHONE (OUTPATIENT)
Dept: PHYSICAL THERAPY | Facility: HOSPITAL | Age: 52
End: 2022-08-31

## 2022-09-01 ENCOUNTER — OFFICE VISIT (OUTPATIENT)
Dept: PHYSICAL THERAPY | Age: 52
End: 2022-09-01
Attending: INTERNAL MEDICINE
Payer: COMMERCIAL

## 2022-09-01 DIAGNOSIS — G56.21 ULNAR NEUROPATHY AT ELBOW, RIGHT: ICD-10-CM

## 2022-09-01 PROCEDURE — 97110 THERAPEUTIC EXERCISES: CPT

## 2022-09-01 PROCEDURE — 97161 PT EVAL LOW COMPLEX 20 MIN: CPT

## 2022-09-07 ENCOUNTER — APPOINTMENT (OUTPATIENT)
Dept: PHYSICAL THERAPY | Age: 52
End: 2022-09-07
Attending: INTERNAL MEDICINE
Payer: COMMERCIAL

## 2022-09-09 ENCOUNTER — OFFICE VISIT (OUTPATIENT)
Dept: PHYSICAL THERAPY | Age: 52
End: 2022-09-09
Attending: INTERNAL MEDICINE
Payer: COMMERCIAL

## 2022-09-09 PROCEDURE — 97110 THERAPEUTIC EXERCISES: CPT

## 2022-09-14 ENCOUNTER — APPOINTMENT (OUTPATIENT)
Dept: PHYSICAL THERAPY | Age: 52
End: 2022-09-14
Attending: INTERNAL MEDICINE
Payer: COMMERCIAL

## 2022-09-15 RX ORDER — MELOXICAM 15 MG/1
15 TABLET ORAL DAILY
Qty: 30 TABLET | Refills: 0 | OUTPATIENT
Start: 2022-09-15

## 2022-09-27 ENCOUNTER — APPOINTMENT (OUTPATIENT)
Dept: PHYSICAL THERAPY | Age: 52
End: 2022-09-27
Attending: INTERNAL MEDICINE
Payer: COMMERCIAL

## 2022-10-04 ENCOUNTER — OFFICE VISIT (OUTPATIENT)
Dept: PHYSICAL THERAPY | Age: 52
End: 2022-10-04
Attending: INTERNAL MEDICINE
Payer: COMMERCIAL

## 2022-10-04 PROCEDURE — 97110 THERAPEUTIC EXERCISES: CPT

## 2023-10-24 ENCOUNTER — OFFICE VISIT (OUTPATIENT)
Dept: INTERNAL MEDICINE CLINIC | Facility: CLINIC | Age: 53
End: 2023-10-24

## 2023-10-24 VITALS
SYSTOLIC BLOOD PRESSURE: 112 MMHG | HEIGHT: 74 IN | BODY MASS INDEX: 26.56 KG/M2 | OXYGEN SATURATION: 98 % | TEMPERATURE: 98 F | DIASTOLIC BLOOD PRESSURE: 80 MMHG | WEIGHT: 207 LBS | RESPIRATION RATE: 16 BRPM | HEART RATE: 82 BPM

## 2023-10-24 DIAGNOSIS — M54.9 UPPER BACK PAIN ON LEFT SIDE: Primary | ICD-10-CM

## 2023-10-24 PROCEDURE — 3008F BODY MASS INDEX DOCD: CPT | Performed by: INTERNAL MEDICINE

## 2023-10-24 PROCEDURE — 3074F SYST BP LT 130 MM HG: CPT | Performed by: INTERNAL MEDICINE

## 2023-10-24 PROCEDURE — 3079F DIAST BP 80-89 MM HG: CPT | Performed by: INTERNAL MEDICINE

## 2023-10-24 PROCEDURE — 99213 OFFICE O/P EST LOW 20 MIN: CPT | Performed by: INTERNAL MEDICINE

## 2023-10-24 RX ORDER — MELOXICAM 15 MG/1
15 TABLET ORAL DAILY
Qty: 30 TABLET | Refills: 0 | Status: SHIPPED | OUTPATIENT
Start: 2023-10-24

## 2023-11-21 ENCOUNTER — APPOINTMENT (OUTPATIENT)
Dept: PHYSICAL THERAPY | Facility: HOSPITAL | Age: 53
End: 2023-11-21
Attending: INTERNAL MEDICINE
Payer: COMMERCIAL

## 2023-11-27 ENCOUNTER — TELEPHONE (OUTPATIENT)
Dept: PHYSICAL THERAPY | Facility: HOSPITAL | Age: 53
End: 2023-11-27

## 2023-11-28 ENCOUNTER — OFFICE VISIT (OUTPATIENT)
Dept: PHYSICAL THERAPY | Facility: HOSPITAL | Age: 53
End: 2023-11-28
Attending: INTERNAL MEDICINE
Payer: COMMERCIAL

## 2023-11-28 DIAGNOSIS — M54.9 UPPER BACK PAIN ON LEFT SIDE: Primary | ICD-10-CM

## 2023-11-28 PROCEDURE — 97161 PT EVAL LOW COMPLEX 20 MIN: CPT

## 2023-11-28 PROCEDURE — 97110 THERAPEUTIC EXERCISES: CPT

## 2023-11-30 ENCOUNTER — OFFICE VISIT (OUTPATIENT)
Dept: PHYSICAL THERAPY | Facility: HOSPITAL | Age: 53
End: 2023-11-30
Attending: INTERNAL MEDICINE
Payer: COMMERCIAL

## 2023-11-30 PROCEDURE — 97110 THERAPEUTIC EXERCISES: CPT

## 2023-11-30 PROCEDURE — 97140 MANUAL THERAPY 1/> REGIONS: CPT

## 2023-11-30 NOTE — PROGRESS NOTES
Diagnosis:   Upper back pain on left side (M54.9)        Referring Provider: Stefanie Garay  Date of Evaluation:    11/28/2023    Precautions:  None Next MD visit:   none scheduled  Date of Surgery: n/a     Subjective: patient reports symptoms are mostly unchanged from last session. Pain: 0-7/10  varies as to what provokes left arm symptoms but did notes some onset of p/n with Cervical retractions in sitting. Objective:   Observation/Posture: mild rounded shoulders, forward head posture slightly sidebent to the right  Neuro Screen:   Initial UE Dermatomes               C4 (above collar bone): intact               C5 (deltoid region): intact               C6 (thumb): intact               C7 (middle finger): diminished L               C8 (pinky finger): diminished L               T1 (medial forearm): intact               T2 (upper medial arm near armpit): intact     Initial UE Myotomes               C1 (cervical flexion): 5/5 B               C2 (cervical extension): 5/5 (increased symptoms in shoulder blade)                C3 (cervical SB): 5/5 B               C4 (shoulder shrug): 5/5 B               C5 (shoulder abd and ER): 5/5 B               C6 (elbow flexion/wrist extension): 5/5 B               C7 (elbow extension/wrist flexion): 5/5 R 4+/5 L               C8 (ulnar deviation, thumb ext): 5/5 R 4+/5 L               T1 (finger abduction): 5/5 B          Initial Cervical AROM: (* denotes performed with pain)  Flexion: 40  Extension: 26*               Initially had limited extension on L, with head slightly side bent to the right   Sidebending: R 45; L 40*  Rotation: R 75; L 70          Oswestry Disability Index Score  Score: 16 % (11/23/2023 10:33 AM)    Assessment: Patient with some increased LUE symptoms with cervical retractions seated, better supine when performed with two pillows. Patient with reduced symptoms when sleeping with block type pillow which is firm support.  Worse with smaller single pillow support. Symptoms continue to be reduce with supine distraction of cervical spine. Goals: (to be met in 8-10 visits)   Pt will improve pain-free cervical AROM flexion to improve tolerance for looking down to tie shoes   Pt will improve cervical AROM extension by 10-20 degrees to improve tolerance for putting dishes into overhead cabinets   Pt will improve pain-free cervical AROM rotation to improve tolerance for turning head to check blind spot while driving  Pt will have improved thoracic PA mobility to WNL to improve cervical ROM as well as promote upright posturing and decreased pain with prolonged sitting   Pt will demonstrate improved cervical intrinsic strength to 4+/5 to allow improved cervical stabilization with overhead reaching  Pt will improve postural strength (mid/low trap) to 4+/5 to promote improved upright posturing and decreased pain with UE activities   Pt will be independent and compliant with comprehensive HEP to maintain progress achieved in PT     Plan: Sent [facility] HEP to patient via email, with f/u next week to assess current treatment response. Date: 11/29/2023  TX#: 2/8-10 Date:                 TX#: 3/ Date:                 TX#: 4/ Date:                 TX#: 5/ Date: Tx#: 6/   Therap ex: 30 mins  - Seated Scapular Retraction   1 sets - 10 reps  some inc symptoms  - -supine chin tuck with cues to avoid excessive retraction with only mild intermittent symptoms at UT  -posture training with chest lift and scap mild retractions avoiding provocation of symptoms  -Thoracic extension seated with neck support of interlocked fingers at T3, T5, T7, 3-5 reps x 5 secs each with foam roll perpendicular to spine avoiding cervical extension.  -Instruction in similar exercise for home with foam roll  across thoracic spine on floor       Manual rx:  15 mins  -supine OA release  -STM to both cervical p/s supine and upper traps.    -prone P/A glides grade 2  -supine cervical distractions/gentle manual traction 5 x 10 secs                         Charges: Dilcia CLAROS       Total Timed Treatment: 45 min  Total Treatment Time: 45 min  Initial : HEP for:   - Supine Suboccipital Release with Tennis Balls  - 1 x daily - 7 x weekly  - Supine Chin Tuck  - 2-3 x daily - 7 x weekly - 2 sets - 10 reps  - Seated Chin Tuck with Neck Elongation  - 2-3 x daily - 7 x weekly - 2 sets - 10 reps  - Seated Scapular Retraction  - 6-8 x daily - 7 x weekly - 2 sets - 10 reps  - Median Nerve Flossing - Tray  - 2 x daily - 7 x weekly - 20 reps    Access Code: [email or web address]  URL: [email or web address]  Date: 11/30/2023  Prepared by: Jaylene Olivo    Program Notes  Jess Shen,  it was good meeting you today. Mainly work on chest lift with posture and try to practice this during work and rest postures. Gentle with these two exercises below. Avoid using video source for these exercises as they have been modified from video. Hold exercises if find there is worsening of symptoms. But you can try to make sure you are relaxed and not straining as this can reduce positive effect of exercise. You can message on my chart if questions.    Take Care Jaylene Olivo PT    Exercises  - Seated Scapular Retraction  - 2-3 x daily - 7 x weekly - 1 sets - 5 reps - 3 sec hold  - Thoracic Mobilization with Hands Behind Head on Foam Roll  - 1 x daily - 5 x weekly - 1 sets - 5 reps - 5 seconds

## 2023-12-04 NOTE — PROGRESS NOTES
Diagnosis:   Upper back pain on left side (M54.9)        Referring Provider: Eitan Espinal  Date of Evaluation:    11/28/2023    Precautions:  None Next MD visit:   none scheduled  Date of Surgery: n/a     Subjective: patient reports symptoms are mostly unchanged from last session- did yoga Sunday which went well. After was activated and bothered him most the day. Got up yesterday and felt okay but once he got into his routine it got more irritated. Went to the gym- incline walking felt it right away and ran, rope exercises x15'    Pain: 0/10 upon arrival      Objective: noted lat tightness with swimmers today;      Observation/Posture: mild rounded shoulders, forward head posture slightly sidebent to the right  Neuro Screen:   Initial UE Dermatomes               C4 (above collar bone): intact               C5 (deltoid region): intact               C6 (thumb): intact               C7 (middle finger): diminished L               C8 (pinky finger): diminished L               T1 (medial forearm): intact               T2 (upper medial arm near armpit): intact     Initial UE Myotomes               C1 (cervical flexion): 5/5 B               C2 (cervical extension): 5/5 (increased symptoms in shoulder blade)                C3 (cervical SB): 5/5 B               C4 (shoulder shrug): 5/5 B               C5 (shoulder abd and ER): 5/5 B               C6 (elbow flexion/wrist extension): 5/5 B               C7 (elbow extension/wrist flexion): 5/5 R 4+/5 L               C8 (ulnar deviation, thumb ext): 5/5 R 4+/5 L               T1 (finger abduction): 5/5 B       Initial Cervical AROM: (* denotes performed with pain)  Flexion: 40  Extension: 26*               Initially had limited extension on L, with head slightly side bent to the right   Sidebending: R 45; L 40*  Rotation: R 75; L 70     Oswestry Disability Index Score  Score: 16 % (11/23/2023 10:33 AM)      Assessment: Exercises performed in antigravity position today, either prone or supine due to increased symptoms while sitting/standing. Good tolerance to manual and new exercises. Pt continues to demonstrate abnormal head positioning, almost sidebending to the left, Encouraged pt to pay attention to head position while working and symptom onset. Goals: (to be met in 8-10 visits)   Pt will improve pain-free cervical AROM flexion to improve tolerance for looking down to tie shoes   Pt will improve cervical AROM extension by 10-20 degrees to improve tolerance for putting dishes into overhead cabinets   Pt will improve pain-free cervical AROM rotation to improve tolerance for turning head to check blind spot while driving  Pt will have improved thoracic PA mobility to WNL to improve cervical ROM as well as promote upright posturing and decreased pain with prolonged sitting   Pt will demonstrate improved cervical intrinsic strength to 4+/5 to allow improved cervical stabilization with overhead reaching  Pt will improve postural strength (mid/low trap) to 4+/5 to promote improved upright posturing and decreased pain with UE activities   Pt will be independent and compliant with comprehensive HEP to maintain progress achieved in PT     Plan: Sent [employer] [facility] HEP to patient via email, with f/u next week to assess current treatment response. Date: 11/29/2023  TX#: 2/8-10 Date: 12/5/23               TX#: 3/8-10 Date:                 TX#: 4/ Date:                 TX#: 5/ Date:    Tx#: 6/   Therap ex: 30 mins  - Seated Scapular Retraction   1 sets - 10 reps  some inc symptoms  - -supine chin tuck with cues to avoid excessive retraction with only mild intermittent symptoms at UT  -posture training with chest lift and scap mild retractions avoiding provocation of symptoms  -Thoracic extension seated with neck support of interlocked fingers at T3, T5, T7, 3-5 reps x 5 secs each with foam roll perpendicular to spine avoiding cervical extension.  -Instruction in similar exercise for home with foam roll  across thoracic spine on floor TE 15 mins  Doorway pec stretching   Supine chin tuck x20   Thoracic extension seated with neck support of interlocked fingers at T3, T5, T7; x20   Open book stretching x10 B    NR 8'  Prone scap retraction x20  Supine swimmers and claps x20 ea  Postural training      Manual rx:  15 mins  -supine OA release  -STM to both cervical p/s supine and upper traps. -prone P/A glides grade 2  -supine cervical distractions/gentle manual traction 5 x 10 secs MT 20 mins  supine OA release  STM to both cervical p/s supine and upper traps, parascap mm  prone cervical P/A/ UPA glides grade 2  Prone thoracic PA/UPA glides GII-III  supine cervical distractions  prone manual scap depression stretching                         Charges: 1 TE, 1 MT, 1 NR Total Timed Treatment: 43 min  Total Treatment Time: 43 min  Initial : HEP for:   - Supine Suboccipital Release with Tennis Balls  - 1 x daily - 7 x weekly  - Supine Chin Tuck  - 2-3 x daily - 7 x weekly - 2 sets - 10 reps  - Seated Chin Tuck with Neck Elongation  - 2-3 x daily - 7 x weekly - 2 sets - 10 reps  - Seated Scapular Retraction  - 6-8 x daily - 7 x weekly - 2 sets - 10 reps  - Median Nerve Flossing - Tray  - 2 x daily - 7 x weekly - 20 reps    Access Code: [email or web address]  URL: [email or web address]  Date: 11/30/2023  Prepared by: Phi Brice    Program Notes  [place],  it was good meeting you today. Mainly work on chest lift with posture and try to practice this during work and rest postures. Gentle with these two exercises below. Avoid using video source for these exercises as they have been modified from video. Hold exercises if find there is worsening of symptoms. But you can try to make sure you are relaxed and not straining as this can reduce positive effect of exercise. You can message on my chart if questions.    Take Care Phi Brice PT    Exercises  - Seated Scapular Retraction  - 2-3 x daily - 7 x weekly - 1 sets - 5 reps - 3 sec hold  - Thoracic Mobilization with Hands Behind Head on Foam Roll  - 1 x daily - 5 x weekly - 1 sets - 5 reps - 5 seconds

## 2023-12-05 ENCOUNTER — OFFICE VISIT (OUTPATIENT)
Dept: PHYSICAL THERAPY | Facility: HOSPITAL | Age: 53
End: 2023-12-05
Attending: INTERNAL MEDICINE
Payer: COMMERCIAL

## 2023-12-05 PROCEDURE — 97112 NEUROMUSCULAR REEDUCATION: CPT

## 2023-12-05 PROCEDURE — 97140 MANUAL THERAPY 1/> REGIONS: CPT

## 2023-12-05 PROCEDURE — 97110 THERAPEUTIC EXERCISES: CPT

## 2023-12-05 NOTE — PROGRESS NOTES
Diagnosis:   Upper back pain on left side (M54.9)        Referring Provider: Noni Mackenzie  Date of Evaluation:    11/28/2023    Precautions:  None Next MD visit:   none scheduled  Date of Surgery: n/a     Subjective: Pt said neck is okay today so far. Overall symptoms have no changed much. Pain: 0/10 upon arrival; 4-5/10 end of session       Objective: MFR to L UT caused increased symptoms down left arm  COLIN -       Observation/Posture: mild rounded shoulders, forward head posture slightly sidebent to the right  Neuro Screen:   Initial UE Dermatomes               C4 (above collar bone): intact               C5 (deltoid region): intact               C6 (thumb): intact               C7 (middle finger): diminished L               C8 (pinky finger): diminished L               T1 (medial forearm): intact               T2 (upper medial arm near armpit): intact     Initial UE Myotomes               C1 (cervical flexion): 5/5 B               C2 (cervical extension): 5/5 (increased symptoms in shoulder blade)                C3 (cervical SB): 5/5 B               C4 (shoulder shrug): 5/5 B               C5 (shoulder abd and ER): 5/5 B               C6 (elbow flexion/wrist extension): 5/5 B               C7 (elbow extension/wrist flexion): 5/5 R 4+/5 L               C8 (ulnar deviation, thumb ext): 5/5 R 4+/5 L               T1 (finger abduction): 5/5 B       Initial Cervical AROM: (* denotes performed with pain)  Flexion: 40  Extension: 26*               Initially had limited extension on L, with head slightly side bent to the right   Sidebending: R 45; L 40*  Rotation: R 75; L 70     Oswestry Disability Index Score  Score: 16 % (11/23/2023 10:33 AM)      Assessment: Pt continues to report radiating symptoms down his left arm, moreso in the forearm today. Radial nerve glides caused more of a stretch in the forearm- generally no worsening symptoms.  Attempted gentle strengthening in standing, which increased symptoms in the forearm. Also noted increased symptoms with MFR along L UT- COLIN was -. Encouraged pt to schedule more appointments, and will continue to plan session depending on symptoms provocation. Goals: (to be met in 8-10 visits)   Pt will improve pain-free cervical AROM flexion to improve tolerance for looking down to tie shoes   Pt will improve cervical AROM extension by 10-20 degrees to improve tolerance for putting dishes into overhead cabinets   Pt will improve pain-free cervical AROM rotation to improve tolerance for turning head to check blind spot while driving  Pt will have improved thoracic PA mobility to WNL to improve cervical ROM as well as promote upright posturing and decreased pain with prolonged sitting   Pt will demonstrate improved cervical intrinsic strength to 4+/5 to allow improved cervical stabilization with overhead reaching  Pt will improve postural strength (mid/low trap) to 4+/5 to promote improved upright posturing and decreased pain with UE activities   Pt will be independent and compliant with comprehensive HEP to maintain progress achieved in PT     Plan: row gh ext prone IYTs if able; manual retraction and extension? IYT  Date: 11/29/2023  TX#: 2/8-10 Date: 12/5/23               TX#: 3/8-10 Date:  12/7/23               TX#: 4/8-10 Date:                 TX#: 5/ Date:    Tx#: 6/ Therap ex: 30 mins  - Seated Scapular Retraction   1 sets - 10 reps  some inc symptoms  - -supine chin tuck with cues to avoid excessive retraction with only mild intermittent symptoms at UT  -posture training with chest lift and scap mild retractions avoiding provocation of symptoms  -Thoracic extension seated with neck support of interlocked fingers at T3, T5, T7, 3-5 reps x 5 secs each with foam roll perpendicular to spine avoiding cervical extension.  -Instruction in similar exercise for home with foam roll  across thoracic spine on floor TE 15 mins  Doorway pec stretching   Supine chin tuck x20 Thoracic extension seated with neck support of interlocked fingers at T3, T5, T7; x20   Open book stretching x10 B    NR 8'  Prone scap retraction x20  Supine swimmers and claps x20 ea  Postural training TE 10 mins  Seated chin tucks- ongoing peripheralization   Radial nerve flossing   Maninder Weeks -  Discussion of symptoms provocation, return to the gym  NR 15'  Prone scap retraction with GH ext x20  Supine swimmers x30  Supine shoulder horiz abd RTB x30  Supine bilat shoulder ER RTB x30   Row 22# x15  GH ext 9# x15  Postural training       Manual rx:  15 mins  -supine OA release  -STM to both cervical p/s supine and upper traps. -prone P/A glides grade 2  -supine cervical distractions/gentle manual traction 5 x 10 secs MT 20 mins  supine OA release  STM to both cervical p/s supine and upper traps, parascap mm  prone cervical P/A/ UPA glides grade 2  Prone thoracic PA/UPA glides GII-III  supine cervical distractions  prone manual scap depression stretching  MT 25 mins  supine OA release  STM to both cervical p/s supine and upper traps, parascap mm  prone cervical P/A/ UPA glides grade 2  Prone thoracic PA/UPA glides GII-III  supine cervical distractions  prone manual scap depression stretching   GII-II rib springing  First rib springing GII-III                     Charges: 1 TE, 2 MT, 1 NR Total Timed Treatment: 50 min  Total Treatment Time: 50 min    Initial : HEP for:   - Supine Suboccipital Release with Tennis Balls  - 1 x daily - 7 x weekly  - Supine Chin Tuck  - 2-3 x daily - 7 x weekly - 2 sets - 10 reps  - Seated Chin Tuck with Neck Elongation  - 2-3 x daily - 7 x weekly - 2 sets - 10 reps  - Seated Scapular Retraction  - 6-8 x daily - 7 x weekly - 2 sets - 10 reps  - Median Nerve Flossing - Tray  - 2 x daily - 7 x weekly - 20 reps    Access Code: [email or web address]  URL: [email or web address]  Date: 11/30/2023  Prepared by: Rosa Isela Mesa    Program Notes  Enrike Tello,  it was good meeting you today.  Mainly work on chest lift with posture and try to practice this during work and rest postures. Gentle with these two exercises below. Avoid using video source for these exercises as they have been modified from video. Hold exercises if find there is worsening of symptoms. But you can try to make sure you are relaxed and not straining as this can reduce positive effect of exercise. You can message on my chart if questions.    Take Care Phi Brice PT    Exercises  - Seated Scapular Retraction  - 2-3 x daily - 7 x weekly - 1 sets - 5 reps - 3 sec hold  - Thoracic Mobilization with Hands Behind Head on Foam Roll  - 1 x daily - 5 x weekly - 1 sets - 5 reps - 5 seconds

## 2023-12-07 ENCOUNTER — OFFICE VISIT (OUTPATIENT)
Dept: PHYSICAL THERAPY | Facility: HOSPITAL | Age: 53
End: 2023-12-07
Attending: INTERNAL MEDICINE
Payer: COMMERCIAL

## 2023-12-07 PROCEDURE — 97140 MANUAL THERAPY 1/> REGIONS: CPT

## 2023-12-07 PROCEDURE — 97110 THERAPEUTIC EXERCISES: CPT

## 2023-12-07 PROCEDURE — 97112 NEUROMUSCULAR REEDUCATION: CPT

## 2023-12-07 NOTE — PROGRESS NOTES
Diagnosis:   Upper back pain on left side (M54.9)        Referring Provider: Gianna Miller  Date of Evaluation:    11/28/2023    Precautions:  None Next MD visit:   none scheduled  Date of Surgery: n/a     Subjective: Pt said symptoms eased by end of the day. Went to the gym Friday- treadmill (symptoms started immediately) and did pulling exercises with low weight. Didn't feel anything while raking leaves. Onset of symptoms have been very inconsistent. Pain: 0/10 upon arrival      Objective: + Bakody sign; restricted upper rib mobility on L    ----------  Observation/Posture: mild rounded shoulders, forward head posture slightly sidebent to the right  Neuro Screen:   Initial UE Dermatomes               C4 (above collar bone): intact               C5 (deltoid region): intact               C6 (thumb): intact               C7 (middle finger): diminished L               C8 (pinky finger): diminished L               T1 (medial forearm): intact               T2 (upper medial arm near armpit): intact     Initial UE Myotomes               C1 (cervical flexion): 5/5 B               C2 (cervical extension): 5/5 (increased symptoms in shoulder blade)                C3 (cervical SB): 5/5 B               C4 (shoulder shrug): 5/5 B               C5 (shoulder abd and ER): 5/5 B               C6 (elbow flexion/wrist extension): 5/5 B               C7 (elbow extension/wrist flexion): 5/5 R 4+/5 L               C8 (ulnar deviation, thumb ext): 5/5 R 4+/5 L               T1 (finger abduction): 5/5 B       Initial Cervical AROM: (* denotes performed with pain)  Flexion: 40  Extension: 26*               Initially had limited extension on L, with head slightly side bent to the right   Sidebending: R 45; L 40*  Rotation: R 75; L 70     Oswestry Disability Index Score  Score: 16 % (11/23/2023 10:33 AM)  ----------    Assessment: Pt continue to report vague symptoms down his left arm, though radicular type symptoms are inconsistent.  He has + Bakody sign, and other subjective complaints suggest cervical radiculopathy and possible TOS. Will continue to emphasize thoracic mobility and postural training, will refer if symptoms do not improve. Goals: (to be met in 8-10 visits)   Pt will improve pain-free cervical AROM flexion to improve tolerance for looking down to tie shoes   Pt will improve cervical AROM extension by 10-20 degrees to improve tolerance for putting dishes into overhead cabinets   Pt will improve pain-free cervical AROM rotation to improve tolerance for turning head to check blind spot while driving  Pt will have improved thoracic PA mobility to WNL to improve cervical ROM as well as promote upright posturing and decreased pain with prolonged sitting   Pt will demonstrate improved cervical intrinsic strength to 4+/5 to allow improved cervical stabilization with overhead reaching  Pt will improve postural strength (mid/low trap) to 4+/5 to promote improved upright posturing and decreased pain with UE activities   Pt will be independent and compliant with comprehensive HEP to maintain progress achieved in PT     Plan: row gh ext prone IYTs if able; postural training, rib and thoracic mobility   Date: 11/29/2023  TX#: 2/8-10 Date: 12/5/23               TX#: 3/8-10 Date:  12/7/23               TX#: 4/8-10 Date: 12/11/23             TX#: 5/8-10 Date:    Tx#: 6/ Therap ex: 30 mins  - Seated Scapular Retraction   1 sets - 10 reps  some inc symptoms  - -supine chin tuck with cues to avoid excessive retraction with only mild intermittent symptoms at UT  -posture training with chest lift and scap mild retractions avoiding provocation of symptoms  -Thoracic extension seated with neck support of interlocked fingers at T3, T5, T7, 3-5 reps x 5 secs each with foam roll perpendicular to spine avoiding cervical extension.  -Instruction in similar exercise for home with foam roll  across thoracic spine on floor TE 15 mins  Doorway pec stretching Supine chin tuck x20   Thoracic extension seated with neck support of interlocked fingers at T3, T5, T7; x20   Open book stretching x10 B    NR 8'  Prone scap retraction x20  Supine swimmers and claps x20 ea  Postural training TE 10 mins  Seated chin tucks- ongoing peripheralization   Radial nerve flossing   Raad Snyder -  Discussion of symptoms provocation, return to the gym  NR 15'  Prone scap retraction with GH ext x20  Supine swimmers x30  Supine shoulder horiz abd RTB x30  Supine bilat shoulder ER RTB x30   Row 22# x15  GH ext 9# x15  Postural training   TE 15 mins  UT and LS stretching - toward R increased radicular symptoms   Bakody sign + L   HL on 1/2 FR, pec stretching 2x30\"  SL open book stretching x20 B  Pt edu: cervical radiculopathy and TOS    NR 15'  Deep cervical flexor endurance training: supine 10x10\"  Prone scap retraction with GH ext x20  Prone GH horiz abd x20  Hooklying (HL) on FR swimmers x30  Supine on 1/2 FR shoulder horiz abd RTB x20  Supine on 1/2 FR bilat shoulder ER RTB x20  Demo bent over row, GH ext and horiz abd to perform at gym      Manual rx:  15 mins  -supine OA release  -STM to both cervical p/s supine and upper traps.    -prone P/A glides grade 2  -supine cervical distractions/gentle manual traction 5 x 10 secs MT 20 mins  supine OA release  STM to both cervical p/s supine and upper traps, parascap mm  prone cervical P/A/ UPA glides grade 2  Prone thoracic PA/UPA glides GII-III  supine cervical distractions  prone manual scap depression stretching  MT 25 mins  supine OA release  STM to both cervical p/s supine and upper traps, parascap mm  prone cervical P/A/ UPA glides grade 2  Prone thoracic PA/UPA glides GII-III  supine cervical distractions  prone manual scap depression stretching   GII-II rib springing  First rib springing GII-III MT 15 mins  supine OA release  STM to both cervical p/s supine and upper traps, parascap mm  Prone thoracic PA/UPA glides GII-III  supine cervical distractions  prone manual scap depression stretching   GII-III rib springing  First rib springing GII-III  Passive retraction with extendion- passive retraction okay, increased pain with passive extension                    Charges: 1 TE, 1 MT, 1 NR Total Timed Treatment: 45 min  Total Treatment Time: 45 min    Initial : HEP for:   - Supine Suboccipital Release with Tennis Balls  - 1 x daily - 7 x weekly  - Supine Chin Tuck  - 2-3 x daily - 7 x weekly - 2 sets - 10 reps  - Seated Chin Tuck with Neck Elongation  - 2-3 x daily - 7 x weekly - 2 sets - 10 reps  - Seated Scapular Retraction  - 6-8 x daily - 7 x weekly - 2 sets - 10 reps  - Median Nerve Flossing - Tray  - 2 x daily - 7 x weekly - 20 reps    Access Code: [email or web address]  URL: [email or web address]  Date: 11/30/2023  Prepared by: Clive Pendleton    Program Notes  Georgina Ade,  it was good meeting you today. Mainly work on chest lift with posture and try to practice this during work and rest postures. Gentle with these two exercises below. Avoid using video source for these exercises as they have been modified from video. Hold exercises if find there is worsening of symptoms. But you can try to make sure you are relaxed and not straining as this can reduce positive effect of exercise. You can message on my chart if questions.    [facility] Clive Pendleton PT    Exercises  - Seated Scapular Retraction  - 2-3 x daily - 7 x weekly - 1 sets - 5 reps - 3 sec hold  - Thoracic Mobilization with Hands Behind Head on Foam Roll  - 1 x daily - 5 x weekly - 1 sets - 5 reps - 5 seconds

## 2023-12-11 ENCOUNTER — OFFICE VISIT (OUTPATIENT)
Dept: PHYSICAL THERAPY | Facility: HOSPITAL | Age: 53
End: 2023-12-11
Attending: INTERNAL MEDICINE
Payer: COMMERCIAL

## 2023-12-11 PROCEDURE — 97112 NEUROMUSCULAR REEDUCATION: CPT

## 2023-12-11 PROCEDURE — 97140 MANUAL THERAPY 1/> REGIONS: CPT

## 2023-12-11 PROCEDURE — 97110 THERAPEUTIC EXERCISES: CPT

## 2023-12-15 NOTE — PROGRESS NOTES
Diagnosis:   Upper back pain on left side (M54.9)        Referring Provider: Varun Marino  Date of Evaluation:    11/28/2023    Precautions:  None Next MD visit:   none scheduled  Date of Surgery: n/a     Subjective: Pt said symptoms have been good over the weekend. Went to the gym Friday and Saturday- was fine. Pain: 0/10 upon arrival      Objective: less symptom onset with exercises today    ----------  Observation/Posture: mild rounded shoulders, forward head posture slightly sidebent to the right  Neuro Screen:   Initial UE Dermatomes               C4 (above collar bone): intact               C5 (deltoid region): intact               C6 (thumb): intact               C7 (middle finger): diminished L               C8 (pinky finger): diminished L               T1 (medial forearm): intact               T2 (upper medial arm near armpit): intact     Initial UE Myotomes               C1 (cervical flexion): 5/5 B               C2 (cervical extension): 5/5 (increased symptoms in shoulder blade)                C3 (cervical SB): 5/5 B               C4 (shoulder shrug): 5/5 B               C5 (shoulder abd and ER): 5/5 B               C6 (elbow flexion/wrist extension): 5/5 B               C7 (elbow extension/wrist flexion): 5/5 R 4+/5 L               C8 (ulnar deviation, thumb ext): 5/5 R 4+/5 L               T1 (finger abduction): 5/5 B       Initial Cervical AROM: (* denotes performed with pain)  Flexion: 40  Extension: 26*               Initially had limited extension on L, with head slightly side bent to the right   Sidebending: R 45; L 40*  Rotation: R 75; L 70     Oswestry Disability Index Score  Score: 16 % (11/23/2023 10:33 AM)  ----------    Assessment: Pt was able to complete all planned exercises without complaints today. Progressed scapular strengthening which he tolerated well. He felt more radicular symptoms along C7 dermatome, rather than vague symptoms throughout the arm specifically in the forearm. Noted some symptoms in forearm with STM today along scapular region.        Goals: (to be met in 8-10 visits)   Pt will improve pain-free cervical AROM flexion to improve tolerance for looking down to tie shoes   Pt will improve cervical AROM extension by 10-20 degrees to improve tolerance for putting dishes into overhead cabinets   Pt will improve pain-free cervical AROM rotation to improve tolerance for turning head to check blind spot while driving  Pt will have improved thoracic PA mobility to WNL to improve cervical ROM as well as promote upright posturing and decreased pain with prolonged sitting   Pt will demonstrate improved cervical intrinsic strength to 4+/5 to allow improved cervical stabilization with overhead reaching  Pt will improve postural strength (mid/low trap) to 4+/5 to promote improved upright posturing and decreased pain with UE activities   Pt will be independent and compliant with comprehensive HEP to maintain progress achieved in PT     Plan: postural training, rib and thoracic mobility   Date: 11/29/2023  TX#: 2/8-10 Date: 12/5/23               TX#: 3/8-10 Date:  12/7/23               TX#: 4/8-10 Date: 12/11/23             TX#: 5/8-10 Date: 12/18/23  Tx#: 6/8-10   Therap ex: 30 mins  - Seated Scapular Retraction   1 sets - 10 reps  some inc symptoms  - -supine chin tuck with cues to avoid excessive retraction with only mild intermittent symptoms at UT  -posture training with chest lift and scap mild retractions avoiding provocation of symptoms  -Thoracic extension seated with neck support of interlocked fingers at T3, T5, T7, 3-5 reps x 5 secs each with foam roll perpendicular to spine avoiding cervical extension.  -Instruction in similar exercise for home with foam roll  across thoracic spine on floor TE 15 mins  Doorway pec stretching   Supine chin tuck x20   Thoracic extension seated with neck support of interlocked fingers at T3, T5, T7; x20   Open book stretching x10 B    NR 8'  Prone scap retraction x20  Supine swimmers and claps x20 ea  Postural training TE 10 mins  Seated chin tucks- ongoing peripheralization   Radial nerve flossing   Blessing Awad -  Discussion of symptoms provocation, return to the gym  NR 15'  Prone scap retraction with GH ext x20  Supine swimmers x30  Supine shoulder horiz abd RTB x30  Supine bilat shoulder ER RTB x30   Row 22# x15  GH ext 9# x15  Postural training   TE 15 mins  UT and LS stretching - toward R increased radicular symptoms   Bakody sign + L   HL on 1/2 FR, pec stretching 2x30\"  SL open book stretching x20 B  Pt edu: cervical radiculopathy and TOS    NR 15'  Deep cervical flexor endurance training: supine 10x10\"  Prone scap retraction with GH ext x20  Prone GH horiz abd x20  Hooklying (HL) on FR swimmers x30  Supine on 1/2 FR shoulder horiz abd RTB x20  Supine on 1/2 FR bilat shoulder ER RTB x20  Demo bent over row, GH ext and horiz abd to perform at gym   TE 18 mins  SB rolls fwd and lat x20 ea  SB up the wall x20  Wall push ups x20  UBE fwd/bwd 4' ea  Pt edu: remaining visits. NR 12'  Prone over SB: IYT 1#  Standing against FR: swimmers and claps x20 ea  Standing against FR shoulder horiz abd GTB x20  Standing against FR bilat shoulder ER GTB x20   Manual rx:  15 mins  -supine OA release  -STM to both cervical p/s supine and upper traps.    -prone P/A glides grade 2  -supine cervical distractions/gentle manual traction 5 x 10 secs MT 20 mins  supine OA release  STM to both cervical p/s supine and upper traps, parascap mm  prone cervical P/A/ UPA glides grade 2  Prone thoracic PA/UPA glides GII-III  supine cervical distractions  prone manual scap depression stretching  MT 25 mins  supine OA release  STM to both cervical p/s supine and upper traps, parascap mm  prone cervical P/A/ UPA glides grade 2  Prone thoracic PA/UPA glides GII-III  supine cervical distractions  prone manual scap depression stretching   GII-II rib springing  First rib springing GII-III MT 15 mins  supine OA release  STM to both cervical p/s supine and upper traps, parascap mm  Prone thoracic PA/UPA glides GII-III  supine cervical distractions  prone manual scap depression stretching   GII-III rib springing  First rib springing GII-III  Passive retraction with extendion- passive retraction okay, increased pain with passive extension MT 10 mins  STM upper traps, LS, parascap mm  Prone thoracic PA/UPA glides GII-III  prone manual scap depression stretching   GII-III rib springing  First rib springing GII-III                   Charges: 1 TE, 1 MT, 1 NR Total Timed Treatment: 40 min  Total Treatment Time: 40 min    Initial : HEP for:   - Supine Suboccipital Release with Tennis Balls  - 1 x daily - 7 x weekly  - Supine Chin Tuck  - 2-3 x daily - 7 x weekly - 2 sets - 10 reps  - Seated Chin Tuck with Neck Elongation  - 2-3 x daily - 7 x weekly - 2 sets - 10 reps  - Seated Scapular Retraction  - 6-8 x daily - 7 x weekly - 2 sets - 10 reps  - Median Nerve Flossing - Tray  - 2 x daily - 7 x weekly - 20 reps    Access Code: [email or web address]  URL: [email or web address]  Date: 11/30/2023  Prepared by: Yady Vaughn    Program Notes  Jessie Shay,  it was good meeting you today. Mainly work on chest lift with posture and try to practice this during work and rest postures. Gentle with these two exercises below. Avoid using video source for these exercises as they have been modified from video. Hold exercises if find there is worsening of symptoms. But you can try to make sure you are relaxed and not straining as this can reduce positive effect of exercise. You can message on my chart if questions.    Take Care Yady Vaughn PT    Exercises  - Seated Scapular Retraction  - 2-3 x daily - 7 x weekly - 1 sets - 5 reps - 3 sec hold  - Thoracic Mobilization with Hands Behind Head on Foam Roll  - 1 x daily - 5 x weekly - 1 sets - 5 reps - 5 seconds

## 2023-12-18 ENCOUNTER — OFFICE VISIT (OUTPATIENT)
Dept: PHYSICAL THERAPY | Facility: HOSPITAL | Age: 53
End: 2023-12-18
Attending: INTERNAL MEDICINE
Payer: COMMERCIAL

## 2023-12-18 PROCEDURE — 97140 MANUAL THERAPY 1/> REGIONS: CPT

## 2023-12-18 PROCEDURE — 97112 NEUROMUSCULAR REEDUCATION: CPT

## 2023-12-18 PROCEDURE — 97110 THERAPEUTIC EXERCISES: CPT

## 2023-12-20 NOTE — PROGRESS NOTES
Diagnosis:   Upper back pain on left side (M54.9)        Referring Provider: Lizbeth Macias  Date of Evaluation:    11/28/2023    Precautions:  None Next MD visit:   none scheduled  Date of Surgery: n/a     Subjective: Pt said symptoms have been good over the weekend. Went to the gym Friday and Saturday- was fine. Pain: 0/10 upon arrival      Objective: less symptom onset with exercises today    ----------  Observation/Posture: mild rounded shoulders, forward head posture slightly sidebent to the right  Neuro Screen:   Initial UE Dermatomes               C4 (above collar bone): intact               C5 (deltoid region): intact               C6 (thumb): intact               C7 (middle finger): diminished L               C8 (pinky finger): diminished L               T1 (medial forearm): intact               T2 (upper medial arm near armpit): intact     Initial UE Myotomes               C1 (cervical flexion): 5/5 B               C2 (cervical extension): 5/5 (increased symptoms in shoulder blade)                C3 (cervical SB): 5/5 B               C4 (shoulder shrug): 5/5 B               C5 (shoulder abd and ER): 5/5 B               C6 (elbow flexion/wrist extension): 5/5 B               C7 (elbow extension/wrist flexion): 5/5 R 4+/5 L               C8 (ulnar deviation, thumb ext): 5/5 R 4+/5 L               T1 (finger abduction): 5/5 B       Initial Cervical AROM: (* denotes performed with pain)  Flexion: 40  Extension: 26*               Initially had limited extension on L, with head slightly side bent to the right   Sidebending: R 45; L 40*  Rotation: R 75; L 70     Oswestry Disability Index Score  Score: 16 % (11/23/2023 10:33 AM)  ----------    Assessment: Pt was able to complete all planned exercises without complaints today. Progressed scapular strengthening which he tolerated well. He felt more radicular symptoms along C7 dermatome, rather than vague symptoms throughout the arm specifically in the forearm. Noted some symptoms in forearm with STM today along scapular region.        Goals: (to be met in 8-10 visits)   Pt will improve pain-free cervical AROM flexion to improve tolerance for looking down to tie shoes   Pt will improve cervical AROM extension by 10-20 degrees to improve tolerance for putting dishes into overhead cabinets   Pt will improve pain-free cervical AROM rotation to improve tolerance for turning head to check blind spot while driving  Pt will have improved thoracic PA mobility to WNL to improve cervical ROM as well as promote upright posturing and decreased pain with prolonged sitting   Pt will demonstrate improved cervical intrinsic strength to 4+/5 to allow improved cervical stabilization with overhead reaching  Pt will improve postural strength (mid/low trap) to 4+/5 to promote improved upright posturing and decreased pain with UE activities   Pt will be independent and compliant with comprehensive HEP to maintain progress achieved in PT     Plan: postural training, rib and thoracic mobility   Date: 12/5/23               TX#: 3/8-10 Date:  12/7/23               TX#: 4/8-10 Date: 12/11/23             TX#: 5/8-10 Date: 12/18/23  Tx#: 6/8-10 Date: 12/21/23  Tx#: 7/8-10   TE 15 mins  Doorway pec stretching   Supine chin tuck x20   Thoracic extension seated with neck support of interlocked fingers at T3, T5, T7; x20   Open book stretching x10 B    NR 8'  Prone scap retraction x20  Supine swimmers and claps x20 ea  Postural training TE 10 mins  Seated chin tucks- ongoing peripheralization   Radial nerve flossing   David Blank -  Discussion of symptoms provocation, return to the gym  NR 15'  Prone scap retraction with GH ext x20  Supine swimmers x30  Supine shoulder horiz abd RTB x30  Supine bilat shoulder ER RTB x30   Row 22# x15  GH ext 9# x15  Postural training   TE 15 mins  UT and LS stretching - toward R increased radicular symptoms   Bakody sign + L   HL on 1/2 FR, pec stretching 2x30\"  SL open book stretching x20 B  Pt edu: cervical radiculopathy and TOS    NR 15'  Deep cervical flexor endurance training: supine 10x10\"  Prone scap retraction with GH ext x20  Prone GH horiz abd x20  Hooklying (HL) on FR swimmers x30  Supine on 1/2 FR shoulder horiz abd RTB x20  Supine on 1/2 FR bilat shoulder ER RTB x20  Demo bent over row, GH ext and horiz abd to perform at gym   TE 18 mins  SB rolls fwd and lat x20 ea  SB up the wall x20  Wall push ups x20  UBE fwd/bwd 4' ea  Pt edu: remaining visits. NR 12'  Prone over SB: IYT 1#  Standing against FR: swimmers and claps x20 ea  Standing against FR shoulder horiz abd GTB x20  Standing against FR bilat shoulder ER GTB x20 TE 18 mins  SB rolls fwd and lat x20 ea  SB up the wall x20  Wall push ups x20  UBE fwd/bwd 4' ea  Pt edu: remaining visits.      NR 12'  Prone over SB: IYT 1#  Standing against FR: swimmers and claps x20 ea  Standing against FR shoulder horiz abd GTB x20  Standing against FR bilat shoulder ER GTB x20   MT 20 mins  supine OA release  STM to both cervical p/s supine and upper traps, parascap mm  prone cervical P/A/ UPA glides grade 2  Prone thoracic PA/UPA glides GII-III  supine cervical distractions  prone manual scap depression stretching  MT 25 mins  supine OA release  STM to both cervical p/s supine and upper traps, parascap mm  prone cervical P/A/ UPA glides grade 2  Prone thoracic PA/UPA glides GII-III  supine cervical distractions  prone manual scap depression stretching   GII-II rib springing  First rib springing GII-III MT 15 mins  supine OA release  STM to both cervical p/s supine and upper traps, parascap mm  Prone thoracic PA/UPA glides GII-III  supine cervical distractions  prone manual scap depression stretching   GII-III rib springing  First rib springing GII-III  Passive retraction with extendion- passive retraction okay, increased pain with passive extension MT 10 mins  STM upper traps, LS, parascap mm  Prone thoracic PA/UPA glides GII-III  prone manual scap depression stretching   GII-III rib springing  First rib springing GII-III MT 10 mins  STM upper traps, LS, parascap mm  Prone thoracic PA/UPA glides GII-III  prone manual scap depression stretching   GII-III rib springing  First rib springing GII-III                   Charges: 1 TE, 1 MT, 1 NR Total Timed Treatment: 40 min  Total Treatment Time: 40 min    Initial : HEP for:   - Supine Suboccipital Release with Tennis Balls  - 1 x daily - 7 x weekly  - Supine Chin Tuck  - 2-3 x daily - 7 x weekly - 2 sets - 10 reps  - Seated Chin Tuck with Neck Elongation  - 2-3 x daily - 7 x weekly - 2 sets - 10 reps  - Seated Scapular Retraction  - 6-8 x daily - 7 x weekly - 2 sets - 10 reps  - Median Nerve Flossing - Tray  - 2 x daily - 7 x weekly - 20 reps    Access Code: [email or web address]  URL: [email or web address]  Date: 11/30/2023  Prepared by: Buzz Kent    Program Notes  Arline Marin,  it was good meeting you today. Mainly work on chest lift with posture and try to practice this during work and rest postures. Gentle with these two exercises below. Avoid using video source for these exercises as they have been modified from video. Hold exercises if find there is worsening of symptoms. But you can try to make sure you are relaxed and not straining as this can reduce positive effect of exercise. You can message on my chart if questions.    Take Care Buzz Kent PT    Exercises  - Seated Scapular Retraction  - 2-3 x daily - 7 x weekly - 1 sets - 5 reps - 3 sec hold  - Thoracic Mobilization with Hands Behind Head on Foam Roll  - 1 x daily - 5 x weekly - 1 sets - 5 reps - 5 seconds

## 2023-12-21 ENCOUNTER — APPOINTMENT (OUTPATIENT)
Dept: PHYSICAL THERAPY | Facility: HOSPITAL | Age: 53
End: 2023-12-21
Attending: INTERNAL MEDICINE
Payer: COMMERCIAL

## 2024-01-02 NOTE — PROGRESS NOTES
Discharge Summary  Pt has attended 7 visits in Physical Therapy.    Diagnosis:   Upper back pain on left side (M54.9)        Referring Provider: Tracee  Date of Evaluation:    11/28/2023    Precautions:  None Next MD visit:   none scheduled  Date of Surgery: n/a     Subjective: Pt says symptoms have completely gotten better- has been able to workout and almost forgot about it.      Pain: 0/10 upon arrival      Objective:     Neuro Screen:   Initial UE Dermatomes               C4 (above collar bone): intact               C5 (deltoid region): intact               C6 (thumb): intact               C7 (middle finger): diminished L               C8 (pinky finger): diminished L               T1 (medial forearm): intact               T2 (upper medial arm near armpit): intact    C7/8 dermatomes: normal 1/4/24     Initial UE Myotomes               C1 (cervical flexion): 5/5 B               C2 (cervical extension): 5/5 (increased symptoms in shoulder blade)                C3 (cervical SB): 5/5 B               C4 (shoulder shrug): 5/5 B               C5 (shoulder abd and ER): 5/5 B               C6 (elbow flexion/wrist extension): 5/5 B               C7 (elbow extension/wrist flexion): 5/5 R 4+/5 L               C8 (ulnar deviation, thumb ext): 5/5 R 4+/5 L               T1 (finger abduction): 5/5 B    1/4/24 Myotomes: normal     Scap strength: 5/5       Initial Cervical AROM: (* denotes performed with pain)  Flexion: 40  1/4/24: 55  Extension: 26*  1/4/24: 45  Sidebending: R 45 L 40*  1/4/24: 55 bilat  Rotation: R 75; L 70        Assessment: Pt arrives to session saying all symptoms have diminished. Hasn't felt anything in a few weeks, since his last visit 12/18/23. He's been back to the gym and did exercises that were aggravating before but felt okay recently. Pt no longer has diminished dermatome and myotomes in C7/8, and his cervical mobility has also improved. Encouraged pt to call if he has remaining questions or  concerns.       Goals: (to be met in 8-10 visits)   Pt will improve pain-free cervical AROM flexion to improve tolerance for looking down to tie shoes met  Pt will improve cervical AROM extension by 10-20 degrees to improve tolerance for putting dishes into overhead cabinets met  Pt will improve pain-free cervical AROM rotation to improve tolerance for turning head to check blind spot while driving met  Pt will have improved thoracic PA mobility to WNL to improve cervical ROM as well as promote upright posturing and decreased pain with prolonged sitting  met  Pt will demonstrate improved cervical intrinsic strength to 4+/5 to allow improved cervical stabilization with overhead reaching met  Pt will improve postural strength (mid/low trap) to 4+/5 to promote improved upright posturing and decreased pain with UE activities  met  Pt will be independent and compliant with comprehensive HEP to maintain progress achieved in PT  met    Oswestry Disability Index Score  Score: 16 % (11/23/2023 10:33 AM)    Post Oswestry Disability Index Score  Post Score: 0 % (1/4/2024  3:36 PM)    16 % improvement    Plan: DC from PT    Patient/Family/Caregiver was advised of these findings, precautions, and treatment options and has agreed to actively participate in planning and for this course of care.    Thank you for your referral. If you have any questions, please contact me at Dept: [phone].    Sincerely,  Electronically signed by therapist: Kosta Charlton, PT       Certification From: 1/4/2024  To:4/3/2024    Date: 12/5/23               TX#: 3/8-10 Date:  12/7/23               TX#: 4/8-10 Date: 12/11/23             TX#: 5/8-10 Date: 12/18/23  Tx#: 6/8-10 Date: 1/4/24  Tx#: 7/8-10   TE 15 mins  Doorway pec stretching   Supine chin tuck x20   Thoracic extension seated with neck support of interlocked fingers at T3, T5, T7; x20   Open book stretching x10 B    NR 8'  Prone scap retraction x20  Supine swimmers and claps x20  ea  Postural training TE 10 mins  Seated chin tucks- ongoing peripheralization   Radial nerve flossing   Kwadwo -  Discussion of symptoms provocation, return to the gym  NR 15'  Prone scap retraction with GH ext x20  Supine swimmers x30  Supine shoulder horiz abd RTB x30  Supine bilat shoulder ER RTB x30   Row 22# x15  GH ext 9# x15  Postural training   TE 15 mins  UT and LS stretching - toward R increased radicular symptoms   Bakody sign + L   HL on 1/2 FR, pec stretching 2x30\"  SL open book stretching x20 B  Pt edu: cervical radiculopathy and TOS    NR 15'  Deep cervical flexor endurance training: supine 10x10\"  Prone scap retraction with GH ext x20  Prone GH horiz abd x20  Hooklying (HL) on FR swimmers x30  Supine on 1/2 FR shoulder horiz abd RTB x20  Supine on 1/2 FR bilat shoulder ER RTB x20  Demo bent over row, GH ext and horiz abd to perform at gym   TE 18 mins  SB rolls fwd and lat x20 ea  SB up the wall x20  Wall push ups x20  UBE fwd/bwd 4' ea  Pt edu: remaining visits.     NR 12'  Prone over SB: IYT 1#  Standing against FR: swimmers and claps x20 ea  Standing against FR shoulder horiz abd GTB x20  Standing against FR bilat shoulder ER GTB x20 TE 12 mins  Reassessment of symptoms- see above    MT 20 mins  supine OA release  STM to both cervical p/s supine and upper traps, parascap mm  prone cervical P/A/ UPA glides grade 2  Prone thoracic PA/UPA glides GII-III  supine cervical distractions  prone manual scap depression stretching  MT 25 mins  supine OA release  STM to both cervical p/s supine and upper traps, parascap mm  prone cervical P/A/ UPA glides grade 2  Prone thoracic PA/UPA glides GII-III  supine cervical distractions  prone manual scap depression stretching   GII-II rib springing  First rib springing GII-III MT 15 mins  supine OA release  STM to both cervical p/s supine and upper traps, parascap mm  Prone thoracic PA/UPA glides GII-III  supine cervical distractions  prone manual scap depression  stretching   GII-III rib springing  First rib springing GII-III  Passive retraction with extendion- passive retraction okay, increased pain with passive extension MT 10 mins  STM upper traps, LS, parascap mm  Prone thoracic PA/UPA glides GII-III  prone manual scap depression stretching   GII-III rib springing  First rib springing GII-III                    Charges: 1 TE Total Timed Treatment: 12 min  Total Treatment Time: 12 min

## 2024-01-04 ENCOUNTER — OFFICE VISIT (OUTPATIENT)
Dept: PHYSICAL THERAPY | Facility: HOSPITAL | Age: 54
End: 2024-01-04
Attending: INTERNAL MEDICINE
Payer: COMMERCIAL

## 2024-01-04 PROCEDURE — 97110 THERAPEUTIC EXERCISES: CPT

## 2024-01-09 ENCOUNTER — APPOINTMENT (OUTPATIENT)
Dept: PHYSICAL THERAPY | Facility: HOSPITAL | Age: 54
End: 2024-01-09
Attending: INTERNAL MEDICINE
Payer: COMMERCIAL

## 2024-01-15 ENCOUNTER — APPOINTMENT (OUTPATIENT)
Dept: PHYSICAL THERAPY | Facility: HOSPITAL | Age: 54
End: 2024-01-15
Attending: INTERNAL MEDICINE
Payer: COMMERCIAL

## 2024-07-02 ENCOUNTER — OFFICE VISIT (OUTPATIENT)
Dept: INTERNAL MEDICINE CLINIC | Facility: CLINIC | Age: 54
End: 2024-07-02
Payer: COMMERCIAL

## 2024-07-02 ENCOUNTER — HOSPITAL ENCOUNTER (OUTPATIENT)
Dept: GENERAL RADIOLOGY | Facility: HOSPITAL | Age: 54
Discharge: HOME | End: 2024-07-02
Attending: INTERNAL MEDICINE
Payer: COMMERCIAL

## 2024-07-02 VITALS
HEIGHT: 74 IN | TEMPERATURE: 98 F | DIASTOLIC BLOOD PRESSURE: 86 MMHG | SYSTOLIC BLOOD PRESSURE: 130 MMHG | OXYGEN SATURATION: 99 % | HEART RATE: 69 BPM | RESPIRATION RATE: 17 BRPM | WEIGHT: 207 LBS | BODY MASS INDEX: 26.56 KG/M2

## 2024-07-02 DIAGNOSIS — M54.2 NECK PAIN: Primary | ICD-10-CM

## 2024-07-02 DIAGNOSIS — M54.2 NECK PAIN: ICD-10-CM

## 2024-07-02 PROCEDURE — 72050 X-RAY EXAM NECK SPINE 4/5VWS: CPT | Performed by: INTERNAL MEDICINE

## 2024-07-02 PROCEDURE — 99214 OFFICE O/P EST MOD 30 MIN: CPT | Performed by: INTERNAL MEDICINE

## 2024-07-02 PROCEDURE — 3079F DIAST BP 80-89 MM HG: CPT | Performed by: INTERNAL MEDICINE

## 2024-07-02 PROCEDURE — 3075F SYST BP GE 130 - 139MM HG: CPT | Performed by: INTERNAL MEDICINE

## 2024-07-02 PROCEDURE — 3008F BODY MASS INDEX DOCD: CPT | Performed by: INTERNAL MEDICINE

## 2024-07-02 RX ORDER — METHYLPREDNISOLONE 4 MG/1
TABLET ORAL
Qty: 1 EACH | Refills: 0 | Status: SHIPPED | OUTPATIENT
Start: 2024-07-02

## 2024-07-02 RX ORDER — CYCLOBENZAPRINE HCL 5 MG
5 TABLET ORAL NIGHTLY PRN
Qty: 30 TABLET | Refills: 0 | Status: SHIPPED | OUTPATIENT
Start: 2024-07-02

## 2024-07-02 NOTE — PROGRESS NOTES
Subjective:   Patient ID: Portillo Warner is a 54 year old male.    HPI    Patient comes in today with complaint of pain to the shoulder blade going down the arm and neck this is a recurrent problem he had similar symptoms in December did physical therapy felt better he has not had any x-rays done on the cervical spine patient does workout in the gym thus lifting this is when the symptoms started denies any weakness to the arm or hand or any other weakness    History/Other:   Review of Systems   Constitutional: Negative.    HENT: Negative.     Eyes: Negative.    Respiratory: Negative.     Cardiovascular: Negative.    Gastrointestinal: Negative.    Genitourinary: Negative.    Musculoskeletal:  Positive for neck pain.   Skin: Negative.    Neurological: Negative.    Psychiatric/Behavioral: Negative.       Current Outpatient Medications   Medication Sig Dispense Refill    methylPREDNISolone (MEDROL) 4 MG Oral Tablet Therapy Pack As directed. 1 each 0    cyclobenzaprine 5 MG Oral Tab Take 1 tablet (5 mg total) by mouth nightly as needed. 30 tablet 0    Meloxicam 15 MG Oral Tab Take 1 tablet (15 mg total) by mouth daily. 30 tablet 0     Allergies:No Known Allergies    Objective:   Physical Exam  Vitals and nursing note reviewed.   Constitutional:       Appearance: He is well-developed.   HENT:      Head: Normocephalic and atraumatic.      Right Ear: External ear normal.      Left Ear: External ear normal.      Nose: Nose normal.   Eyes:      Conjunctiva/sclera: Conjunctivae normal.      Pupils: Pupils are equal, round, and reactive to light.   Cardiovascular:      Rate and Rhythm: Normal rate and regular rhythm.      Heart sounds: Normal heart sounds.   Pulmonary:      Effort: Pulmonary effort is normal.      Breath sounds: Normal breath sounds.   Abdominal:      General: Bowel sounds are normal.      Palpations: Abdomen is soft.   Genitourinary:     Penis: Normal.       Prostate: Normal.      Rectum: Normal.    Musculoskeletal:         General: Tenderness present. Normal range of motion.      Cervical back: Normal range of motion and neck supple.      Comments: Left side neck, going down arm    Skin:     General: Skin is warm and dry.   Neurological:      Mental Status: He is alert and oriented to person, place, and time.      Deep Tendon Reflexes: Reflexes are normal and symmetric.         Assessment & Plan:   1. Neck pain most likely herniated bulging or inflamed disc pinching on the nerve will order an x-ray to look at vertebral will treat with steroids muscle relaxant will refer to physiatry any worsening symptoms let us know avoid any heavy lifting specially over the shoulders       No orders of the defined types were placed in this encounter.      Meds This Visit:  Requested Prescriptions     Signed Prescriptions Disp Refills    methylPREDNISolone (MEDROL) 4 MG Oral Tablet Therapy Pack 1 each 0     Sig: As directed.    cyclobenzaprine 5 MG Oral Tab 30 tablet 0     Sig: Take 1 tablet (5 mg total) by mouth nightly as needed.       Imaging & Referrals:  PHYSIATRY - INTERNAL  XR CERVICAL SPINE ([id number]) (CPT=72050)

## 2024-07-15 ENCOUNTER — OFFICE VISIT (OUTPATIENT)
Dept: PHYSICAL MEDICINE AND REHAB | Facility: CLINIC | Age: 54
End: 2024-07-15
Payer: COMMERCIAL

## 2024-07-15 VITALS
OXYGEN SATURATION: 96 % | RESPIRATION RATE: 18 BRPM | BODY MASS INDEX: 26.56 KG/M2 | HEIGHT: 74 IN | HEART RATE: 79 BPM | WEIGHT: 207 LBS

## 2024-07-15 DIAGNOSIS — M54.12 CERVICAL RADICULOPATHY: Primary | ICD-10-CM

## 2024-07-15 RX ORDER — GABAPENTIN 300 MG/1
CAPSULE ORAL
Qty: 90 CAPSULE | Refills: 0 | Status: SHIPPED | OUTPATIENT
Start: 2024-07-15

## 2024-07-15 NOTE — PROGRESS NOTES
[facility]  NEW PATIENT EVALUATION    Consultation as a request of Dr. Martinez      HISTORY OF PRESENT ILLNESS:     Chief Complaint   Patient presents with    Neck Pain     New right handed Pt is coming in for right sided cervical neck pain, Pt states that he has had pain for a few years, States that pain radiates to left arm down to left hand, Admits to N/T. States that it comes and goes, Pain 1/10 states that he has done PT in the past, states that he does not know if PT helped, is taking medication, X-Ray on file        The patient is a 54 year old male with significant past medical history of ashtma who presents with neck pain.  Patient states he has had the symptoms flareup over the last several years.  His last episode was last year which lasted 3 months.  Pain radiates from the neck into the arm into the hand with numbness tingling sensation.  He has severe pain in the shoulder blade as well.  At that time he completed medication and physical therapy.  Again this year at the similar symptoms started about a month ago.  Since then he has completed Medrol Dosepak and took 1 muscle relaxer.  Overall pain is improved but is still there rated 1 out of 10.  Pain radiates to the arm along the posterior aspect.  Sometimes it radiates into the forearm with numbness tingling sensation into the hand.  He denies any weakness, any saddle anesthesia, any loss of bowel bladder control, any fevers chills or weight loss.  He has had x-ray imaging of the cervical spine as noted below.  He states that PT and medications have been effective but do not resolve the issue underlying that seems to continue to affect him.  He is leaving out of town on Sunday for 2 weeks and is wondering if we could do anything to help with the symptoms while he is on vacation.      PHYSICAL EXAM:   Pulse 79   Resp 18   Ht 74\"   Wt 207 lb (93.9 kg)   SpO2 96%   BMI 26.58 kg/m²     Gait:  Normal    CERVICAL SPINE:  Inspection: no erythema, swelling, or obvious deformity  Palpation: no ttp over spinous process or paraspinal muscles bilaterally.  No tenderness to palpation over facet joints bilaterally  ROM: intact to all planes of motion of cervical spine including side-bend bilaterally, rotation bilaterally, flexion, and extension   Strength: 5/5 in upper extremities bilaterally  Sensation: Intact to light touch in all dermatomes of the bilateral upper extremities  Reflexes: 2/4 at C5, C6, C7 bilaterally  Spurling Test: negative for radicular symptoms down either extremity bilaterally  Tolliver's sign: negative bilaterally    IMAGING:     X-ray cervical spine completed on 7/2/2024 was personally reviewed which is notable for mild degenerative disc disease in the mid cervical spine from C4-C7    All imaging results were reviewed and discussed with patient.      ASSESSMENT/PLAN:     1. Cervical radiculopathy        Portillo Warner is a 54-year-old male presenting today for evaluation of left-sided neck pain with radiation in the left arm consistent with cervical radiculopathy in the C6 and C7 distribution.  I recommend MRI imaging of the cervical spine as he has had multiple rounds of PT oral medications without any improvement.  He may benefit from interventional cervical spine procedure versus neurosurgical intervention.  Recommend he start gabapentin 300 mg at nighttime slowly increase to 3 times daily and restarted PT program with home exercises until follow-up.      The patient verbalized understanding with the plan and was in agreement. All questions/concerns were addressed and there were no barriers to learning.  Please note Dragon dictation software was used to dictate this note and can result in inadvertent typos.    Leif Bright D.O. FAAPMR & CAQSM  Physical Medicine and Rehabilitation  Sports and Spine Medicine      PAST MEDICAL HISTORY:     Past Medical History:    Asthma ([facility])     moderate. not full reversible with ICS.    Tubular adenoma of colon    repeat CLN in  7 years    Varicose vein of leg         PAST SURGICAL HISTORY:     Past Surgical History:   Procedure Laterality Date    Colonoscopy N/A 2022    Procedure: COLONOSCOPY;  Surgeon: TAMMIE Barker MD;  Location: [facility] ENDOSCOPY    Other      lasik         CURRENT MEDICATIONS:     Current Outpatient Medications   Medication Sig Dispense Refill    gabapentin 300 MG Oral Cap Start with night time dose only. If well tolerated, increase to two times daily. If well tolerated, increase to three times daily. 90 capsule 0    methylPREDNISolone (MEDROL) 4 MG Oral Tablet Therapy Pack As directed. 1 each 0    cyclobenzaprine 5 MG Oral Tab Take 1 tablet (5 mg total) by mouth nightly as needed. 30 tablet 0    Meloxicam 15 MG Oral Tab Take 1 tablet (15 mg total) by mouth daily. 30 tablet 0         ALLERGIES:   No Known Allergies      FAMILY HISTORY:     Family History   Problem Relation Age of Onset    Hypertension Mother     Asthma Mother     Cancer Father         Lung    Other (Other) Father         Tob abuse,  from \"complications of smoking\"           SOCIAL HISTORY:     Social History     Socioeconomic History    Marital status:    Tobacco Use    Smoking status: Never    Smokeless tobacco: Never   Vaping Use    Vaping status: Never Used   Substance and Sexual Activity    Alcohol use: Yes     Comment: social    Drug use: No   Social History Narrative    Live with family        Work insurance        Moved to [place] recently from [facility]          REVIEW OF SYSTEMS:   No patient-reported data collected this visit.       PHYSICAL EXAM:     General: No immediate distress  Head: Normocephalic/ Atraumatic  Eyes: Extra-occular movements intact.   Ears: No auricular hematoma or deformities  Mouth: No lesions or ulcerations  Heart: peripheral pulses intact. Normal capillary refill.   Lungs: Non-labored respirations  Abdomen: No abdominal  guarding  Extremities: No lower extremity edema bilaterally   Skin: No lesions noted   Cognition: alert & oriented x 3, attentive, able to follow 2 step commands, comprehention intact, spontaneous speech intact  Psychiatric: Mood and affect appropriate    LABS:     Lab Results   Component Value Date     03/11/2022    A1C 5.3 03/11/2022     Lab Results   Component Value Date    WBC 6.8 03/11/2022    RBC 5.17 03/11/2022    HGB 14.8 03/11/2022    HCT 44.6 03/11/2022    MCV 86.3 03/11/2022    MCH 28.6 03/11/2022    MCHC 33.2 03/11/2022    RDW 11.9 03/11/2022    .0 03/11/2022     Lab Results   Component Value Date    GLU 91 03/11/2022    BUN 17 03/11/2022    BUNCREA 15.5 03/11/2022    CREATSERUM 1.10 03/11/2022    ANIONGAP 4 03/11/2022    GFRNAA 78 03/11/2022    GFRAA 90 03/11/2022    CA 9.5 03/11/2022    OSMOCALC 287 03/11/2022    ALKPHO 74 03/11/2022    AST 19 03/11/2022    ALT 24 03/11/2022    BILT 0.7 03/11/2022    TP 7.1 03/11/2022    ALB 4.3 03/11/2022    GLOBULIN 2.8 03/11/2022     03/11/2022    K 4.5 03/11/2022     03/11/2022    CO2 29.0 03/11/2022     No results found for: \"PTP\", \"PT\", \"INR\"  No results found for: \"VITD\", \"QVITD\", \"TVRR99LE\"

## 2024-07-15 NOTE — PATIENT INSTRUCTIONS
Start Gabapentin 300mg at nighttime and slowly increase it to 3 times daily  Start physical therapy and home exercises  MRI of the cervical spine and follow-up after  We will discuss possible cervical epidural injection after MRI imaging

## 2024-08-06 ENCOUNTER — TELEPHONE (OUTPATIENT)
Dept: PHYSICAL MEDICINE AND REHAB | Facility: CLINIC | Age: 54
End: 2024-08-06

## 2024-08-06 ENCOUNTER — MED REC SCAN ONLY (OUTPATIENT)
Dept: PHYSICAL MEDICINE AND REHAB | Facility: CLINIC | Age: 54
End: 2024-08-06

## 2024-08-06 NOTE — TELEPHONE ENCOUNTER
Imaging results of MRI Cervical Sp w/o contrast received from [facility] via fax. Placed in RN folder.

## 2024-08-08 NOTE — TELEPHONE ENCOUNTER
A copy of the MRI of cervical spine report dated 8/5/24 placed on Dr Bright's folder in his office to review.   A copy of the report made and placed for scanning.

## 2024-08-12 ENCOUNTER — TELEPHONE (OUTPATIENT)
Dept: PHYSICAL MEDICINE AND REHAB | Facility: CLINIC | Age: 54
End: 2024-08-12

## 2024-08-12 ENCOUNTER — OFFICE VISIT (OUTPATIENT)
Dept: PHYSICAL MEDICINE AND REHAB | Facility: CLINIC | Age: 54
End: 2024-08-12
Payer: COMMERCIAL

## 2024-08-12 VITALS
OXYGEN SATURATION: 98 % | DIASTOLIC BLOOD PRESSURE: 90 MMHG | WEIGHT: 205 LBS | SYSTOLIC BLOOD PRESSURE: 122 MMHG | HEART RATE: 82 BPM | BODY MASS INDEX: 26 KG/M2

## 2024-08-12 DIAGNOSIS — M54.12 CERVICAL RADICULOPATHY: Primary | ICD-10-CM

## 2024-08-12 PROCEDURE — 3080F DIAST BP >= 90 MM HG: CPT | Performed by: PHYSICAL MEDICINE & REHABILITATION

## 2024-08-12 PROCEDURE — 3074F SYST BP LT 130 MM HG: CPT | Performed by: PHYSICAL MEDICINE & REHABILITATION

## 2024-08-12 PROCEDURE — 99214 OFFICE O/P EST MOD 30 MIN: CPT | Performed by: PHYSICAL MEDICINE & REHABILITATION

## 2024-08-12 NOTE — PROGRESS NOTES
[facility]  NEW PATIENT EVALUATION      HISTORY OF PRESENT ILLNESS:     Chief Complaint   Patient presents with    Follow - Up     Lov 7/10/2024 Bilateral neck pain. Pain 0/10. No pain medication at the moment. Continues PT with no relief. No HEP at the moment. Mri 8/5/2024 . No T/N .        The patient is a 54 year old male with significant past medical history of ashtma who presents for follow-up evaluation of neck pain.  Here for review of his MRI today.  He states overall he is doing okay.  While he was on vacation when he had his arm overhead he did feel some numbness tingling radiating down the arm into the hand.  He did had 2 sessions of PT.  He does home exercises when the pain is more severe.  At the moment pain is 0 out of 10 but at times it still limits his functional capabilities and he is having repetitive flareups and he is wondering about long-term improvement.      PHYSICAL EXAM:   /90   Pulse 82   Wt 205 lb (93 kg)   SpO2 98%   BMI 26.32 kg/m²     Gait: Normal    CERVICAL SPINE:  Inspection: no erythema, swelling, or obvious deformity  Palpation: no ttp over spinous process or paraspinal muscles bilaterally.  No tenderness to palpation over facet joints bilaterally  ROM: intact to all planes of motion of cervical spine including side-bend bilaterally, rotation bilaterally, flexion, and extension   Strength: 5/5 in upper extremities bilaterally  Sensation: Intact to light touch in all dermatomes of the bilateral upper extremities  Reflexes: 2/4 at C5, C6, C7 bilaterally  Spurling Test: negative for radicular symptoms down either extremity bilaterally  Tolliver's sign: negative bilaterally    IMAGING:   MRI cervical spine completed on 8/5/24    I personally reviewed MRI imaging which is notable for mild disc space narrowing at C5-6 most prominent at C5-6 and C6-7.  There is left greater than right facet arthrosis and a small diffuse disc bulge  with mild flattening of the thecal sac and mild left neuroforaminal narrowing at C5-6.  There is minimal left neuroforaminal narrowing at C6-7.    All imaging results were reviewed and discussed with patient.      ASSESSMENT/PLAN:     1. Cervical radiculopathy          Portillo Warner is a 54-year-old male presenting today for evaluation of left-sided neck pain with radiation in the left arm consistent with cervical radiculopathy in the C6 and C7 distribution.  MRI imaging is consistent with these exam findings with degenerative disc at C5-6 and C6-7 as noted above.  I recommended a C7-T1 interlaminar epidural steroid injection under fluoroscopy guidance under local anesthesia.  The risks and benefits were discussed and he is elected to have the procedure done.  Recommend that he continue home exercises and try to return back to the gym and we may consider neuropathic pain medicine in the future if needed as well.    The patient verbalized understanding with the plan and was in agreement. All questions/concerns were addressed and there were no barriers to learning.  Please note Dragon dictation software was used to dictate this note and can result in inadvertent typos.    Leif Bright D.O. FAAPMR & CAQSM  Physical Medicine and Rehabilitation  Sports and Spine Medicine      PAST MEDICAL HISTORY:     Past Medical History:    Asthma ([facility])    moderate. not full reversible with ICS.    Tubular adenoma of colon    repeat CLN in  7 years    Varicose vein of leg         PAST SURGICAL HISTORY:     Past Surgical History:   Procedure Laterality Date    Colonoscopy N/A 7/20/2022    Procedure: COLONOSCOPY;  Surgeon: TAMMIE Barker MD;  Location: [facility] ENDOSCOPY    Other      lasik         CURRENT MEDICATIONS:     Current Outpatient Medications   Medication Sig Dispense Refill    gabapentin 300 MG Oral Cap Start with night time dose only. If well tolerated, increase to two times daily. If well tolerated, increase to three  times daily. (Patient not taking: Reported on 2024) 90 capsule 0    methylPREDNISolone (MEDROL) 4 MG Oral Tablet Therapy Pack As directed. (Patient not taking: Reported on 2024) 1 each 0    cyclobenzaprine 5 MG Oral Tab Take 1 tablet (5 mg total) by mouth nightly as needed. (Patient not taking: Reported on 2024) 30 tablet 0    Meloxicam 15 MG Oral Tab Take 1 tablet (15 mg total) by mouth daily. 30 tablet 0         ALLERGIES:   No Known Allergies      FAMILY HISTORY:     Family History   Problem Relation Age of Onset    Hypertension Mother     Asthma Mother     Cancer Father         Lung    Other (Other) Father         Tob abuse,  from \"complications of smoking\"           SOCIAL HISTORY:     Social History     Socioeconomic History    Marital status:    Tobacco Use    Smoking status: Never    Smokeless tobacco: Never   Vaping Use    Vaping status: Never Used   Substance and Sexual Activity    Alcohol use: Yes     Comment: social    Drug use: No   Social History Narrative    Live with family        Work insurance        Moved to [place] recently from [facility]          REVIEW OF SYSTEMS:   No patient-reported data collected this visit.       PHYSICAL EXAM:     General: No immediate distress  Head: Normocephalic/ Atraumatic  Eyes: Extra-occular movements intact.   Ears: No auricular hematoma or deformities  Mouth: No lesions or ulcerations  Heart: peripheral pulses intact. Normal capillary refill.   Lungs: Non-labored respirations  Abdomen: No abdominal guarding  Extremities: No lower extremity edema bilaterally   Skin: No lesions noted   Cognition: alert & oriented x 3, attentive, able to follow 2 step commands, comprehention intact, spontaneous speech intact  Psychiatric: Mood and affect appropriate    LABS:     Lab Results   Component Value Date     2022    A1C 5.3 2022     Lab Results   Component Value Date    WBC 6.8 2022    RBC 5.17 2022    HGB 14.8 2022     HCT 44.6 03/11/2022    MCV 86.3 03/11/2022    MCH 28.6 03/11/2022    MCHC 33.2 03/11/2022    RDW 11.9 03/11/2022    .0 03/11/2022     Lab Results   Component Value Date    GLU 91 03/11/2022    BUN 17 03/11/2022    BUNCREA 15.5 03/11/2022    CREATSERUM 1.10 03/11/2022    ANIONGAP 4 03/11/2022    GFRNAA 78 03/11/2022    GFRAA 90 03/11/2022    CA 9.5 03/11/2022    OSMOCALC 287 03/11/2022    ALKPHO 74 03/11/2022    AST 19 03/11/2022    ALT 24 03/11/2022    BILT 0.7 03/11/2022    TP 7.1 03/11/2022    ALB 4.3 03/11/2022    GLOBULIN 2.8 03/11/2022     03/11/2022    K 4.5 03/11/2022     03/11/2022    CO2 29.0 03/11/2022     No results found for: \"PTP\", \"PT\", \"INR\"  No results found for: \"VITD\", \"QVITD\", \"[id number]\"

## 2024-08-12 NOTE — TELEPHONE ENCOUNTER
Initiated authorization for C7-T1 Interlaminar Epidural Steroid Injection under fluoroscopy guidance under local anesthesia. CPT/HCPCS 75760, dx:M54.12 to be done at [facility] with [employer]    Status: Approved - no auth required  Reference/Authorization # [id number]  Valid: 8/12/24-11/12/24

## 2024-09-09 ENCOUNTER — APPOINTMENT (OUTPATIENT)
Dept: SURGERY | Facility: CLINIC | Age: 54
End: 2024-09-09
Payer: COMMERCIAL

## 2025-06-11 ENCOUNTER — OFFICE VISIT (OUTPATIENT)
Dept: PHYSICAL MEDICINE AND REHAB | Facility: CLINIC | Age: 55
End: 2025-06-11
Payer: COMMERCIAL

## 2025-06-11 VITALS — WEIGHT: 205 LBS | BODY MASS INDEX: 26.31 KG/M2 | HEIGHT: 74 IN

## 2025-06-11 DIAGNOSIS — M54.12 CERVICAL RADICULOPATHY: Primary | ICD-10-CM

## 2025-06-11 PROCEDURE — 3008F BODY MASS INDEX DOCD: CPT | Performed by: PHYSICAL MEDICINE & REHABILITATION

## 2025-06-11 PROCEDURE — 99214 OFFICE O/P EST MOD 30 MIN: CPT | Performed by: PHYSICAL MEDICINE & REHABILITATION

## 2025-06-11 RX ORDER — GABAPENTIN 300 MG/1
CAPSULE ORAL
Qty: 90 CAPSULE | Refills: 0 | Status: SHIPPED | OUTPATIENT
Start: 2025-06-11

## 2025-06-11 RX ORDER — METHYLPREDNISOLONE 4 MG/1
TABLET ORAL
Qty: 1 EACH | Refills: 0 | Status: SHIPPED | OUTPATIENT
Start: 2025-06-11

## 2025-06-11 NOTE — PROGRESS NOTES
The following individual(s) verbally consented to be recorded using ambient AI listening technology and understand that they can each withdraw their consent to this listening technology at any point by asking the clinician to turn off or pause the recording:    Patient name: Portillo Warner  Additional names:  ZULMA CHO

## 2025-06-11 NOTE — PATIENT INSTRUCTIONS
Start PT and home exercises  Medrol dose to be started  Gabapentin 300mg three times daily   Follow up in 4 weeks

## 2025-06-18 NOTE — PROGRESS NOTES
[facility]  OFFICE FOLLOW UP EVALUATION      HISTORY OF PRESENT ILLNESS:     Chief Complaint   Patient presents with    Follow - Up     LOV 8/12/24 pt is here for a follow up on neck pain. No n/t. No pain meds or muscle relaxer's. No current physical therapy. States pain depends on activity or movement .        History of Present Illness  Portillo Warner is a 55 year old male who presents with recurrent shoulder and arm pain.    The pain is located behind the shoulder blade and extends into the triceps, with a severity of 7 to 8 out of 10 on some days. Symptoms recurred two months ago, with a significant flare-up one month ago. He describes a sensation of a 'humongous knot' behind the shoulder blade with persistent pain.    The pain is more noticeable during workouts, although he continues to exercise. Rest and ibuprofen at the end of the day help alleviate discomfort. He has not been taking gabapentin or other nerve pain medications recently.    In the past, he received an injection that provided relief for about six months. Physical therapy was somewhat beneficial, particularly at a specific location. He lives in [place] and previously attended therapy sessions at a facility on [address], which was less effective.    He experiences no pain during sleep and can sleep through the night.    PHYSICAL EXAM:     Ht 74\"   Wt 205 lb (93 kg)   BMI 26.32 kg/m²     CERVICAL SPINE:  Inspection: no erythema, swelling, or obvious deformity  Palpation: no ttp over spinous process or paraspinal muscles bilaterally.  No tenderness to palpation over facet joints bilaterally  ROM: intact to all planes of motion of cervical spine including side-bend bilaterally, rotation bilaterally, flexion, and extension   Strength: 5/5 in upper extremities bilaterally  Sensation: Intact to light touch in all dermatomes of the bilateral upper extremities  Reflexes: 2/4 at C5, C6, C7  bilaterally  Spurling Test: negative for radicular symptoms down either extremity bilaterally  Tolliver's sign: negative bilaterally    IMAGING:     MRI cervical spine completed on 8/5/24     I personally reviewed MRI imaging which is notable for mild disc space narrowing at C5-6 most prominent at C5-6 and C6-7.  There is left greater than right facet arthrosis and a small diffuse disc bulge with mild flattening of the thecal sac and mild left neuroforaminal narrowing at C5-6.  There is minimal left neuroforaminal narrowing at C6-7.    All imaging results were reviewed and discussed with patient.      ASSESSMENT/PLAN:     1. Cervical radiculopathy        Assessment & Plan  Cervical radiculopathy  Recurrent cervical radiculopathy with pain behind shoulder blade and triceps, exacerbated to 7-8/10. Previous injection provided six months relief. Symptoms suggest re-aggravation of same disc area, likely due to compression and potential reherniation. Conservative management preferred; surgery considered if measures fail. Informed consent obtained for conservative management. Discussed potential microdiscectomy if symptoms persist.  - Prescribe steroid pack for one week to reduce inflammation.  - Prescribe gabapentin, starting at night and increasing to three times daily, to manage neuropathic pain.  - Refer to physical therapy for exercises to decrease nerve root irritation, twice weekly for four weeks.  - Schedule follow-up in four weeks to assess response to treatment and consider further interventions if necessary.      The patient verbalized understanding with the plan and was in agreement. All questions/concerns were addressed and there were no barriers to learning.  Please note Dragon dictation software was used to dictate this note and may result in inadvertent typos.    Leif Bright DO, FAAPMR & CAQSM  Physical Medicine and Rehabilitation  Sports and Spine Medicine    PAST MEDICAL HISTORY:   Past Medical  History[1]      PAST SURGICAL HISTORY:   Past Surgical History[2]      CURRENT MEDICATIONS:   Current Medications[3]      ALLERGIES:   Allergies[4]      FAMILY HISTORY:   Family History[5]       SOCIAL HISTORY:   Short Social Hx on File[6]       REVIEW OF SYSTEMS:   A comprehensive 10 point review of systems was completed.  Pertinent positives and negatives noted in the the HPI.      LABS:     Lab Results   Component Value Date     03/11/2022    A1C 5.3 03/11/2022     Lab Results   Component Value Date    WBC 6.8 03/11/2022    RBC 5.17 03/11/2022    HGB 14.8 03/11/2022    HCT 44.6 03/11/2022    MCV 86.3 03/11/2022    MCH 28.6 03/11/2022    MCHC 33.2 03/11/2022    RDW 11.9 03/11/2022    .0 03/11/2022     Lab Results   Component Value Date    GLU 91 03/11/2022    BUN 17 03/11/2022    BUNCREA 15.5 03/11/2022    CREATSERUM 1.10 03/11/2022    ANIONGAP 4 03/11/2022    GFRNAA 78 03/11/2022    GFRAA 90 03/11/2022    CA 9.5 03/11/2022    OSMOCALC 287 03/11/2022    ALKPHO 74 03/11/2022    AST 19 03/11/2022    ALT 24 03/11/2022    BILT 0.7 03/11/2022    TP 7.1 03/11/2022    ALB 4.3 03/11/2022    GLOBULIN 2.8 03/11/2022     03/11/2022    K 4.5 03/11/2022     03/11/2022    CO2 29.0 03/11/2022     No results found for: \"PTP\", \"PT\", \"INR\"  No results found for: \"VITD\", \"QVITD\", \"[id number]\"           [1]   Past Medical History:   Asthma (HCC)    moderate. not full reversible with ICS.    Pain    Tubular adenoma of colon    repeat CLN in  7 years    Varicose vein of leg   [2]   Past Surgical History:  Procedure Laterality Date    Colonoscopy N/A 7/20/2022    Procedure: COLONOSCOPY;  Surgeon: TAMMIE Barker MD;  Location: [facility] ENDOSCOPY    Other      lasik   [3]   Current Outpatient Medications   Medication Sig Dispense Refill    methylPREDNISolone 4 MG Oral Tablet Therapy Pack As directed 1 each 0    gabapentin 300 MG Oral Cap Start with night time dose only. If well tolerated, increase to two times daily.  If well tolerated, increase to three times daily. 90 capsule 0    ibuprofen 200 MG Oral Tab Take 200 mg by mouth every 6 (six) hours as needed for Pain. Pt took 3 tablets      gabapentin 300 MG Oral Cap Start with night time dose only. If well tolerated, increase to two times daily. If well tolerated, increase to three times daily. (Patient not taking: Reported on 2024) 90 capsule 0    methylPREDNISolone (MEDROL) 4 MG Oral Tablet Therapy Pack As directed. (Patient not taking: Reported on 2024) 1 each 0    cyclobenzaprine 5 MG Oral Tab Take 1 tablet (5 mg total) by mouth nightly as needed. (Patient not taking: Reported on 2024) 30 tablet 0    Meloxicam 15 MG Oral Tab Take 1 tablet (15 mg total) by mouth daily. 30 tablet 0   [4] No Known Allergies  [5]   Family History  Problem Relation Age of Onset    Hypertension Mother     Asthma Mother     Cancer Father         Lung    Other (Other) Father         Tob abuse,  from \"complications of smoking\"     Stroke Maternal Grandfather     Stroke Maternal Grandmother     Stroke Paternal Grandmother    [6]   Social History  Socioeconomic History    Marital status:    Tobacco Use    Smoking status: Never    Smokeless tobacco: Never   Vaping Use    Vaping status: Never Used   Substance and Sexual Activity    Alcohol use: Yes     Comment: social    Drug use: No   Social History Narrative    Live with family        Work insurance        Moved to [place] recently from [facility]

## 2025-07-09 ENCOUNTER — OFFICE VISIT (OUTPATIENT)
Dept: PHYSICAL MEDICINE AND REHAB | Facility: CLINIC | Age: 55
End: 2025-07-09
Payer: COMMERCIAL

## 2025-07-09 VITALS — HEIGHT: 74 IN | WEIGHT: 205 LBS | BODY MASS INDEX: 26.31 KG/M2

## 2025-07-09 DIAGNOSIS — M54.12 CERVICAL RADICULOPATHY: Primary | ICD-10-CM

## 2025-07-09 PROCEDURE — 3008F BODY MASS INDEX DOCD: CPT | Performed by: PHYSICAL MEDICINE & REHABILITATION

## 2025-07-09 PROCEDURE — 99214 OFFICE O/P EST MOD 30 MIN: CPT | Performed by: PHYSICAL MEDICINE & REHABILITATION

## 2025-07-09 NOTE — PROGRESS NOTES
[facility]  OFFICE FOLLOW UP EVALUATION      HISTORY OF PRESENT ILLNESS:     Chief Complaint   Patient presents with    Follow - Up     LOV 6/11/25 pt is here for a follow up on neck pain. Reports n/t in left arm. Takes gabapentin daily . No current physical therapy.        History of Present Illness  Portillo Warner is a 55 year old male with cervical radiculopathy who presents with persistent arm pain and tingling.    Over the past four weeks, symptoms have become less consistent. Tingling is present in the arm and hand, with pain flaring behind the shoulder and sometimes in the elbow. Pain is rated as six or seven out of ten at its worst.    He takes gabapentin once in the evening, despite an initial prescription for three times daily. Initial significant improvement was noted with gabapentin and a steroid, but current relief is moderate.    An epidural injection in the neck last October provided significant relief, but symptoms have returned. Pain and discomfort in the arm are particularly noticeable after gym activities, with pain sometimes in the elbow and wrist.    He regularly goes to the gym but experiences discomfort in the elbow and wrist after certain activities.    PHYSICAL EXAM:     Ht 74\"   Wt 205 lb (93 kg)   BMI 26.32 kg/m²     CERVICAL SPINE:  Inspection: no erythema, swelling, or obvious deformity  Palpation: no ttp over spinous process or paraspinal muscles bilaterally.  No tenderness to palpation over facet joints bilaterally  ROM: intact to all planes of motion of cervical spine including side-bend bilaterally, rotation bilaterally, flexion, and extension   Strength: 5/5 in upper extremities bilaterally  Sensation: Intact to light touch in all dermatomes of the bilateral upper extremities  Reflexes: 2/4 at C5, C6, C7 bilaterally  Spurling Test: negative for radicular symptoms down either extremity bilaterally  Tolliver's sign: negative  bilaterally    IMAGING:     MRI cervical spine completed on 8/5/24     I personally reviewed MRI imaging which is notable for mild disc space narrowing at C5-6 most prominent at C5-6 and C6-7.  There is left greater than right facet arthrosis and a small diffuse disc bulge with mild flattening of the thecal sac and mild left neuroforaminal narrowing at C5-6.  There is minimal left neuroforaminal narrowing at C6-7.    All imaging results were reviewed and discussed with patient.      ASSESSMENT/PLAN:     1. Cervical radiculopathy      Assessment & Plan  Cervical radiculopathy  Intermittent tingling and pain from shoulder to hand, severity up to 6-7/10. Symptoms decreased but persist. Gabapentin once daily may be insufficient. Previous epidural injection provided relief.   - Increase gabapentin to twice daily.  - Order MRI of the cervical spine to assess if there has been any progression of the disc disease and radiculopathy.  - Commence physical therapy next week.  - Consider repeating epidural injection if symptoms persist after MRI and physical therapy.  - Schedule follow-up in 2-3 weeks after MRI and physical therapy.      The patient verbalized understanding with the plan and was in agreement. All questions/concerns were addressed and there were no barriers to learning.  Please note Dragon dictation software was used to dictate this note and may result in inadvertent typos.    Leif Bright DO, FAAPMR & CAQSM  Physical Medicine and Rehabilitation  Sports and Spine Medicine    PAST MEDICAL HISTORY:   Past Medical History[1]      PAST SURGICAL HISTORY:   Past Surgical History[2]      CURRENT MEDICATIONS:   Current Medications[3]      ALLERGIES:   Allergies[4]      FAMILY HISTORY:   Family History[5]       SOCIAL HISTORY:   Short Social Hx on File[6]       REVIEW OF SYSTEMS:   A comprehensive 10 point review of systems was completed.  Pertinent positives and negatives noted in the the HPI.      LABS:     Lab Results    Component Value Date     03/11/2022    A1C 5.3 03/11/2022     Lab Results   Component Value Date    WBC 6.8 03/11/2022    RBC 5.17 03/11/2022    HGB 14.8 03/11/2022    HCT 44.6 03/11/2022    MCV 86.3 03/11/2022    MCH 28.6 03/11/2022    MCHC 33.2 03/11/2022    RDW 11.9 03/11/2022    .0 03/11/2022     Lab Results   Component Value Date    GLU 91 03/11/2022    BUN 17 03/11/2022    BUNCREA 15.5 03/11/2022    CREATSERUM 1.10 03/11/2022    ANIONGAP 4 03/11/2022    GFRNAA 78 03/11/2022    GFRAA 90 03/11/2022    CA 9.5 03/11/2022    OSMOCALC 287 03/11/2022    ALKPHO 74 03/11/2022    AST 19 03/11/2022    ALT 24 03/11/2022    BILT 0.7 03/11/2022    TP 7.1 03/11/2022    ALB 4.3 03/11/2022    GLOBULIN 2.8 03/11/2022     03/11/2022    K 4.5 03/11/2022     03/11/2022    CO2 29.0 03/11/2022     No results found for: \"PTP\", \"PT\", \"INR\"  No results found for: \"VITD\", \"QVITD\", \"[id number]\"           [1]   Past Medical History:   Asthma (HCC)    moderate. not full reversible with ICS.    Pain    Tubular adenoma of colon    repeat CLN in  7 years    Varicose vein of leg   [2]   Past Surgical History:  Procedure Laterality Date    Colonoscopy N/A 7/20/2022    Procedure: COLONOSCOPY;  Surgeon: TAMMIE Barker MD;  Location: [facility] ENDOSCOPY    Other      lasik   [3]   Current Outpatient Medications   Medication Sig Dispense Refill    methylPREDNISolone 4 MG Oral Tablet Therapy Pack As directed 1 each 0    gabapentin 300 MG Oral Cap Start with night time dose only. If well tolerated, increase to two times daily. If well tolerated, increase to three times daily. 90 capsule 0    ibuprofen 200 MG Oral Tab Take 200 mg by mouth every 6 (six) hours as needed for Pain. Pt took 3 tablets      gabapentin 300 MG Oral Cap Start with night time dose only. If well tolerated, increase to two times daily. If well tolerated, increase to three times daily. (Patient not taking: Reported on 8/12/2024) 90 capsule 0     methylPREDNISolone (MEDROL) 4 MG Oral Tablet Therapy Pack As directed. (Patient not taking: Reported on 2024) 1 each 0    cyclobenzaprine 5 MG Oral Tab Take 1 tablet (5 mg total) by mouth nightly as needed. (Patient not taking: Reported on 2024) 30 tablet 0    Meloxicam 15 MG Oral Tab Take 1 tablet (15 mg total) by mouth daily. 30 tablet 0   [4] No Known Allergies  [5]   Family History  Problem Relation Age of Onset    Hypertension Mother     Asthma Mother     Cancer Father         Lung    Other (Other) Father         Tob abuse,  from \"complications of smoking\"     Stroke Maternal Grandfather     Stroke Maternal Grandmother     Stroke Paternal Grandmother    [6]   Social History  Socioeconomic History    Marital status:    Tobacco Use    Smoking status: Never    Smokeless tobacco: Never   Vaping Use    Vaping status: Never Used   Substance and Sexual Activity    Alcohol use: Yes     Comment: social    Drug use: No   Social History Narrative    Live with family        Work insurance        Moved to [place] recently from [facility]

## 2025-07-09 NOTE — PATIENT INSTRUCTIONS
-MRI of the cervical spine and follow up after  -Start PT and home exercises  -Gabapentin 300mg twice daily

## 2025-07-11 ENCOUNTER — TELEPHONE (OUTPATIENT)
Dept: PHYSICAL THERAPY | Facility: HOSPITAL | Age: 55
End: 2025-07-11

## 2025-07-11 ENCOUNTER — OFFICE VISIT (OUTPATIENT)
Dept: PHYSICAL THERAPY | Age: 55
End: 2025-07-11
Attending: PHYSICAL MEDICINE & REHABILITATION
Payer: COMMERCIAL

## 2025-07-11 DIAGNOSIS — M54.12 CERVICAL RADICULOPATHY: Primary | ICD-10-CM

## 2025-07-11 PROCEDURE — 97161 PT EVAL LOW COMPLEX 20 MIN: CPT

## 2025-07-11 PROCEDURE — 97110 THERAPEUTIC EXERCISES: CPT

## 2025-07-11 NOTE — PROGRESS NOTES
SPINE EVALUATION:     Diagnosis:   Cervical radiculopathy (M54.12) Patient:  Portillo Warner (55 year old, male)        Referring Provider: Leif Bright  Today's Date   7/11/2025    Precautions:      Date of Evaluation: 07/11/25  Next MD visit: No data recorded  Date of Surgery: No data recorded     PATIENT SUMMARY     Summary of chief complaints: left upper extremity pain  History of current condition: Patient states that symptoms started a few years ago. Had physical therapy and symptoms went away. Pain came back last year so did another round of physical therapy which helped but pain returned. Patient went to a specialist and had an injection in the neck which was helpful for 6 months. Patient is scheduled for an MRI. Has not kept up with the physical therapy exercises.   Pain level: current 0 /10, at best 0 /10, at worst 7 /10  Description of symptoms: ball underneath the shoulder blade, entirety of dorsal and palmar aspect of hand   Aggravating factors: unsure  Easing factors: laying down  24-hour pattern: pain progresses throughout the day   Occupation: works for insurance company     Prior level of function: IND with ADLs and IADLs  Current limitations: occupational duties, exercising  Pt goals: pain relief  Red flag signs/symptoms: Pt denies dizziness, drop attacks, dysphagia, dysarthria, diplopia; Pt denies pain that wakes in sleep, fever, recent trauma, history of CA, pain unchanged with movement/activity    Past medical history was reviewed with Portillo.  Significant findings include:    Imaging/Tests: see chart   Portillo  has a past medical history of Asthma (HCC), Pain (8/1/2022), Tubular adenoma of colon (2022), and Varicose vein of leg.  He  has a past surgical history that includes other and colonoscopy (N/A, 7/20/2022).    ASSESSMENT  Portillo presents to physical therapy evaluation with primary c/o left upper extremity pain. The results of the objective tests and measures show paresthesias  with cervical passive accessory range of motion, paresthesias with cervical active range of motion. Functional deficits include but are not limited to occupational duties, exercising. Signs and symptoms are consistent with diagnosis of cervical radicular pain. Pt and PT discussed evaluation findings, pathology, POC and HEP.  Pt voiced understanding and performs HEP correctly without reported pain. Skilled Physical Therapy is medically necessary to address the above impairments and reach functional goals.    OBJECTIVE:      Musculoskeletal:  Vitals:   BP: 140/97, SpO2 and HR VSS  Observation/Posture: forward head posture; rounded shoulders (prominent CTJ)   Accessory Motion: hypomobile  and UPAs throughout, endorses changes in paresthesias throughout the L forearm and hand   Palpation: R/L upper trap, suboccipitals pain free     Special tests:   Cervical distraction: no change in p*     ROM and Strength:  (* denotes performed with pain)    Cervical ROM     Flex WFL, potential decrease in paresthesias (upper cervical flexion limited)     Ext limited, unclear if change in paresthesias (upper cervical extension limited)    R L     Side bend limited, unclear if change in paresthesias limited, unclear if change in paresthesias     Rotation limited, potential decrease in paresthesias limited, unclear if change in paresthesias     Neurological:  Sensation: grossly intact to light touch MOSES UE/LE     Deep Tendon Reflexes: -- (unable to elicit R reflexes, 2+ L biceps and brachioradialis, absent L triceps)     Myotomes: R/L UE WFL     Today's Treatment and Response:     Today's Treatment       7/11/2025   Spine Treatment   Therapeutic Exercise - Pt education was provided on exam findings, treatment diagnosis, treatment plan, expectations, and prognosis.  - Thoracic extension over foam roller    Therapeutic Exercise Minutes 8   Evaluation Minutes 37   Total Time Of Timed Procedures 8   Total Time Of Service-Based Procedures  37   Total Treatment Time 45   HEP - Thoracic extension over foam roller         Patient was instructed in and issued a HEP for: - Thoracic extension over foam roller     Charges:  PT EVAL: Low Complexity, EX 1  In agreement with evaluation findings and clinical rationale, this evaluation involved LOW COMPLEXITY decision making due to no personal factors/comorbidities, 1-2 body structures involved/activity limitations, and stable symptoms as documented in the evaluation.                                                                         PLAN OF CARE:      Goals: (to be met in 10 visits)    Not Met Progress  Toward Partially  Met Met   Patient will improve QuickDASH by at least 10 points to demonstrate minimally clinically important difference in patient reported outcome measure for upper extremity pain.  [] [] [] []   Patient will demonstrate at least 60 degrees of left cervical active range of motion without pain or paresthesias to facilitate independence with ADLs and IADLs.  [] [] [] []   Patient will demonstrate at least 60 degrees of cervical extension active range of motion without pain or paresthesias to facilitate independence with ADLs and IADLs.  [] [] [] []       Frequency / Duration: Patient will be seen 1-2x/week or a total of 10  visits over a 90 day period. Treatment will include: Manual Therapy; Neuromuscular Re-education; Self-Care Home Management; Therapeutic Exercise; Therapeutic Activities; Home Exercise Program instruction; Electrical stimulation (attended); Patient/Family Education    Education or treatment limitation: None   Rehab Potential: good     QuickDASH Outcome Score  Score: (Patient-Rptd) 9.09 % (7/10/2025  4:50 PM)      Patient/Family/Caregiver was advised of these findings, precautions, and treatment options and has agreed to actively participate in planning and for this course of care.    Thank you for your referral. Please co-sign or sign and return this letter via fax as soon  as possible to [phone]. If you have any questions, please contact me at Dept: [phone]    Sincerely,  Electronically signed by therapist: Megan Lea PT  Physician's certification required: Yes  I certify the need for these services furnished under this plan of treatment and while under my care.    X___________________________________________________ Date____________________    Certification From: 7/11/2025  To: 10/9/2025

## 2025-07-14 ENCOUNTER — TELEPHONE (OUTPATIENT)
Dept: PHYSICAL THERAPY | Age: 55
End: 2025-07-14

## 2025-07-18 ENCOUNTER — APPOINTMENT (OUTPATIENT)
Dept: PHYSICAL THERAPY | Age: 55
End: 2025-07-18
Attending: PHYSICAL MEDICINE & REHABILITATION
Payer: COMMERCIAL

## 2025-07-23 ENCOUNTER — OFFICE VISIT (OUTPATIENT)
Dept: PHYSICAL THERAPY | Age: 55
End: 2025-07-23
Attending: PHYSICAL MEDICINE & REHABILITATION
Payer: COMMERCIAL

## 2025-07-23 PROCEDURE — 97110 THERAPEUTIC EXERCISES: CPT

## 2025-07-23 PROCEDURE — 97112 NEUROMUSCULAR REEDUCATION: CPT

## 2025-07-23 PROCEDURE — 97140 MANUAL THERAPY 1/> REGIONS: CPT

## 2025-07-23 NOTE — PROGRESS NOTES
Patient: Portillo Warner (55 year old, male) Referring Provider:  Insurance:   Diagnosis: Cervical radiculopathy (M54.12) Leif Bright  [employer] [place]   Date of Surgery: No data recorded Next MD visit:  N/A   Precautions:    No data recorded Referral Information:    Date of Evaluation: Req: 0, Auth: 0, Exp:     07/11/25 POC Auth Visits:          Today's Date   7/23/2025    Subjective  Patient states that since he's been here his pain has gone down. Not sure if it's because of the exercise.       Pain: 0/10     Objective       Cervical PAIVMS: hypomobile  and L UPAs, endorses paresthesias in hand and forearm in prone, lateral glides hypomobile    First rib PAIVMS: R/L inferior glide hypomobile     Palpation: pain free L upper trap, infraspinatus, supraspinatus     Neuroprovocation: all UE nerves negative         7/23/2025   UE Flexibility   Rt Pec Major WNL       Pec minor: R/L restricted   Lt Pec Major min restricted         Assessment  Patient with onset of paresthesias in prone positioning but unclear if paresthesias related to palpation of rotator cuff muscles or internal rotation of the shoulder.    Goals (to be met in 10 visits)      Not Met Progress  Toward Partially  Met Met   Patient will improve QuickDASH by at least 10 points to demonstrate minimally clinically important difference in patient reported outcome measure for upper extremity pain.  [] [] [] []   Patient will demonstrate at least 60 degrees of left cervical active range of motion without pain or paresthesias to facilitate independence with ADLs and IADLs.  [] [] [] []   Patient will demonstrate at least 60 degrees of cervical extension active range of motion without pain or paresthesias to facilitate independence with ADLs and IADLs.  [] [] [] []           Plan  1-2x/week for 8 visits, first rib PAIVMS in sitting, RC and axioscapular MMT, cervical muscle length    Treatment Last 4 Visits  Treatment Day: 2       7/11/2025 7/23/2025    Spine Treatment   Therapeutic Exercise - Pt education was provided on exam findings, treatment diagnosis, treatment plan, expectations, and prognosis.  - Thoracic extension over foam roller  - Trialed pec major stretch in 90/90 position, discontinued 2/2 paresthesias  - Standing pec minor stretch with arms by side   - Cervical retraction + extension x10   Neuro Re-Education  - Neuroprovocation assessment (see objective)     Manual Therapy  - Cervical and first PAIVMs (see objective)  - Palpation (see objective)  - L C6/7 gr III lateral glide // unclear if change in paresthesias    Therapeutic Exercise Minutes 8 8   Neuro Re-Educ Minutes  14   Manual Therapy Minutes  23   Evaluation Minutes 37    Total Time Of Timed Procedures 8 45   Total Time Of Service-Based Procedures 37 0   Total Treatment Time 45 45   HEP - Thoracic extension over foam roller  - Thoracic extension over foam roller   - Cervical retraction + extension 2x10, daily   - Pec minor doorway stretch 3x30s, daily         HEP  - Thoracic extension over foam roller   - Cervical retraction + extension 2x10, daily   - Pec minor doorway stretch 3x30s, daily     Charges  NMRE 1, MT 2, EX 1

## 2025-07-25 ENCOUNTER — OFFICE VISIT (OUTPATIENT)
Dept: PHYSICAL THERAPY | Age: 55
End: 2025-07-25
Attending: PHYSICAL MEDICINE & REHABILITATION
Payer: COMMERCIAL

## 2025-07-25 PROCEDURE — 97112 NEUROMUSCULAR REEDUCATION: CPT

## 2025-07-25 PROCEDURE — 97530 THERAPEUTIC ACTIVITIES: CPT

## 2025-07-25 PROCEDURE — 97110 THERAPEUTIC EXERCISES: CPT

## 2025-07-25 NOTE — PROGRESS NOTES
Patient: Portillo Warner (55 year old, male) Referring Provider:  Insurance:   Diagnosis: Cervical radiculopathy (M54.12) Jerkenny ANANTH Bright  [employer] [place]   Date of Surgery: No data recorded Next MD visit:  N/A   Precautions:    No data recorded Referral Information:    Date of Evaluation: Req: 0, Auth: 0, Exp:     07/11/25 POC Auth Visits:          Today's Date   7/25/2025    Subjective  Patient states that he has not had the pain or numbness over the last few days.       Pain: 0/10     Objective       Sitting in B shoulder IR: no paresthesias    First rib PAIVMS: R/L hypomobile, pain free        7/25/2025   Shoulder ROM/MMT   Rt Low Trap MMT 4   Lt Low Trap MMT 4-   Rt Mid Trap MMT 4   Lt Mid Trap MMT 4        Assessment  Patient continues with onset of paresthesias with movements that stress the neurovascular bundle through the pec. Patient with axioscapular muscle weakness as likely contributing factor.    Goals (to be met in 10 visits)      Not Met Progress  Toward Partially  Met Met   Patient will improve QuickDASH by at least 10 points to demonstrate minimally clinically important difference in patient reported outcome measure for upper extremity pain.  [] [] [] []   Patient will demonstrate at least 60 degrees of left cervical active range of motion without pain or paresthesias to facilitate independence with ADLs and IADLs.  [] [] [] []   Patient will demonstrate at least 60 degrees of cervical extension active range of motion without pain or paresthesias to facilitate independence with ADLs and IADLs.  [] [] [] []               Plan  1-2x/week for 7 visits, RC muscle testing, cervical muscle length assessment, review HEP    Treatment Last 4 Visits  Treatment Day: 3       7/11/2025 7/23/2025 7/25/2025   Spine Treatment   Therapeutic Exercise - Pt education was provided on exam findings, treatment diagnosis, treatment plan, expectations, and prognosis.  - Thoracic extension over foam roller  - Trialed pec  major stretch in 90/90 position, discontinued 2/2 paresthesias  - Standing pec minor stretch with arms by side   - Cervical retraction + extension x10 - MMT (see objective)  - Trialed L prone T, discontinued 2/2 paresthesias   - Trialed wall cristhian, discontinued 2/2 lack of   - Thoracic extension over foam roller 2x10, mid back and upper back  - Pec stretch over 1/2 foam roller 2x10, discontinued 2/2 paresthesias   - R/L 1/2 kneeling thoracic rotation x10  - Reviewed doorway pec stretch and cervical retraction + extension   Neuro Re-Education  - Neuroprovocation assessment (see objective)   - Standing shoulder retraction with x1 set with RTB to fatigue, x2 sets with GTB to fatigue      Manual Therapy  - Cervical and first PAIVMs (see objective)  - Palpation (see objective)  - L C6/7 gr III lateral glide // unclear if change in paresthesias  - First rib PAIVMS (see objective)     Therapeutic Activity   - Wall slide with foam roller x2 sets to fatigue, with YTB to fatigue    Therapeutic Exercise Minutes 8 8 23   Neuro Re-Educ Minutes  14 8   Manual Therapy Minutes  23 2   Therapeutic Activity Minutes   8   Evaluation Minutes 37     Total Time Of Timed Procedures 8 45 41   Total Time Of Service-Based Procedures 37 0 0   Total Treatment Time 45 45 41   HEP - Thoracic extension over foam roller  - Thoracic extension over foam roller   - Cervical retraction + extension 2x10, daily   - Pec minor doorway stretch 3x30s, daily  - Thoracic extension over foam roller   - Cervical retraction + extension 2x10, daily   - Pec minor doorway stretch 3x30s, daily   - R/L 1/2 kneeling thoracic rotation 2x10, daily   - Banded pull apart with GTB x3 sets to fatigue, daily   - Serratus/low trap wall slide with foam roller 3x10, daily         HEP  - Thoracic extension over foam roller   - Cervical retraction + extension 2x10, daily   - Pec minor doorway stretch 3x30s, daily   - R/L 1/2 kneeling thoracic rotation 2x10, daily   - Banded pull  apart with GTB x3 sets to fatigue, daily   - Serratus/low trap wall slide with foam roller 3x10, daily     Charges  MT 0, TA 1, NMRE 1, EX 2

## 2025-07-30 ENCOUNTER — OFFICE VISIT (OUTPATIENT)
Dept: PHYSICAL THERAPY | Age: 55
End: 2025-07-30
Attending: PHYSICAL MEDICINE & REHABILITATION
Payer: COMMERCIAL

## 2025-07-30 PROCEDURE — 97112 NEUROMUSCULAR REEDUCATION: CPT

## 2025-07-30 PROCEDURE — 97530 THERAPEUTIC ACTIVITIES: CPT

## 2025-07-30 PROCEDURE — 97110 THERAPEUTIC EXERCISES: CPT

## 2025-08-01 ENCOUNTER — APPOINTMENT (OUTPATIENT)
Dept: PHYSICAL THERAPY | Age: 55
End: 2025-08-01
Attending: PHYSICAL MEDICINE & REHABILITATION

## 2025-08-03 ENCOUNTER — HOSPITAL ENCOUNTER (OUTPATIENT)
Dept: MRI IMAGING | Facility: HOSPITAL | Age: 55
Discharge: HOME | End: 2025-08-03
Attending: PHYSICAL MEDICINE & REHABILITATION

## 2025-08-03 ENCOUNTER — HOSPITAL ENCOUNTER (OUTPATIENT)
Dept: MRI IMAGING | Facility: HOSPITAL | Age: 55
End: 2025-08-03
Attending: PHYSICAL MEDICINE & REHABILITATION

## 2025-08-03 DIAGNOSIS — M54.12 CERVICAL RADICULOPATHY: ICD-10-CM

## 2025-08-03 PROCEDURE — 72141 MRI NECK SPINE W/O DYE: CPT | Performed by: PHYSICAL MEDICINE & REHABILITATION

## 2025-08-04 ENCOUNTER — APPOINTMENT (OUTPATIENT)
Dept: PHYSICAL THERAPY | Age: 55
End: 2025-08-04
Attending: PHYSICAL MEDICINE & REHABILITATION

## 2025-08-04 ENCOUNTER — OFFICE VISIT (OUTPATIENT)
Dept: PHYSICAL MEDICINE AND REHAB | Facility: CLINIC | Age: 55
End: 2025-08-04

## 2025-08-04 VITALS — WEIGHT: 205 LBS | HEIGHT: 74 IN | BODY MASS INDEX: 26.31 KG/M2

## 2025-08-04 DIAGNOSIS — M54.12 CERVICAL RADICULOPATHY: Primary | ICD-10-CM

## 2025-08-04 PROCEDURE — 3008F BODY MASS INDEX DOCD: CPT | Performed by: PHYSICAL MEDICINE & REHABILITATION

## 2025-08-04 PROCEDURE — 99214 OFFICE O/P EST MOD 30 MIN: CPT | Performed by: PHYSICAL MEDICINE & REHABILITATION

## 2025-08-05 ENCOUNTER — APPOINTMENT (OUTPATIENT)
Dept: PHYSICAL THERAPY | Age: 55
End: 2025-08-05
Attending: PHYSICAL MEDICINE & REHABILITATION

## 2025-08-07 ENCOUNTER — APPOINTMENT (OUTPATIENT)
Dept: PHYSICAL THERAPY | Age: 55
End: 2025-08-07
Attending: PHYSICAL MEDICINE & REHABILITATION

## 2025-08-11 ENCOUNTER — APPOINTMENT (OUTPATIENT)
Dept: PHYSICAL THERAPY | Age: 55
End: 2025-08-11
Attending: PHYSICAL MEDICINE & REHABILITATION

## (undated) DIAGNOSIS — Z12.11 SCREENING FOR COLON CANCER: Primary | ICD-10-CM

## (undated) DEVICE — SNARE ENDOSCOPIC 10MM ROUND

## (undated) DEVICE — MASK PROC W/VISOR ANTIGLARE

## (undated) DEVICE — TRAP MCS 40ML 5IN PLS SCR CAP

## (undated) DEVICE — 35 ML SYRINGE REGULAR TIP: Brand: MONOJECT

## (undated) DEVICE — LINE MNTR ADLT SET O2 INTMD

## (undated) DEVICE — KIT CLEAN ENDOKIT 1.1OZ GOWNX2

## (undated) DEVICE — KIT ENDO ORCAPOD 160/180/190

## (undated) DEVICE — MEDI-VAC NON-CONDUCTIVE SUCTION TUBING 6MM X 1.8M (6FT.) L: Brand: CARDINAL HEALTH

## (undated) NOTE — LETTER
[address], [place]  Authorization for Invasive Procedures  1. I hereby authorize Dr. Kasia Cornell , my physician and whomever may be designated as the doctor's assistant, to perform the following operation and/or procedure:  Colonoscopy on Ricardo Chavez at [facility].    2. My physician has explained to me the nature and purpose of the operation or other procedure, possible alternative methods of treatment, the risks involved and the possibility of complications to me. I understand the probable consequences of declining the recommended procedure and the alternative methods of treatment. I acknowledge that no guarantee has been made as to the result that may be obtained. 3. I recognize that during the course of this operation or other procedure, unforeseen conditions may necessitate additional or different procedures than those listed above. I, therefore, further authorize and request that the above-named physician, his/her physician assistants, or designees perform such procedures as are, in his/her professional opinion, necessary and desirable. If I have a Do Not Attempt Resuscitation (DNAR) order in place, that status will be suspended while in the operating room, procedural suite, and during the recovery period unless otherwise explicitly stated by me (or a person authorized to consent on my behalf). The surgeon or my attending physician will determine when the applicable recovery period ends for purposes of reinstating the DNAR order. 4. Should the need arise during my operation or immediate post-operative period; I also consent to the administration of blood and/or blood products.  Further, I understand that despite careful testing and screening of blood and blood products, I may still be subject to ill effects as a result of recieving a blood transfusion an/or blood producst. The following are some, but not all, of the potential risks that can occur: fever and allergic reactions, hemolytic reactions, transmission of disease such as hepatitis, AIDS, cytomegalovirus (CMV), and flluid overload. In the event that I wish to have autologous transfusions of my own blood, or a directed donor transfusion, I will discuss this with my physician. 5. I consent to the photographing of the operations or procedures to be performed for the purposes of advancing medicine, science, and/or education, provided my identity is not revealed. If the procedure has been videotaped, the physician/surgeon will obtain the original videotape. The hospital will not be responsible for storage or maintenance of this tape. 6. I consent to the presence of a  or observer as deemed necessary by my physician or his designee. 7. Any tissues or organs removed in the operation or other procedure may be disposed of by and at the discretion of [facility].    8. I understand that the physician and his/her physician assistants may not be employees or agents of [facility], [facility], [facility], but are independent medical practitioners who have been permitted to use its facilities for the care and treatment of their patients. 9. Patients having a sterilization procedure: I understand that if the procedure is successful the results will be permanent and it will therefore be impossible for me to inseminate, conceive or bear children. I also understand that the procedure is intended to result in sterility, although the result has not been guaranteed. 10. I CERTIFY THAT I HAVE READ AND FULLY UNDERSTAND THE ABOVE CONSENT TO OPERATION and/or OTHER PROCEDURE. 11. I acknowledge that my physician has explained sedation/analgesia administration to me including the risks and benefits. I consent to the administration of sedation/analgesia as may be necessary or desirable in the judgment of my physician.      Signature of Patient:  ________________________________________________ Date: _________Time: _________    Responsible person in case of minor or unconscious: _____________________________Relationship: ____________     Witness Signature: ____________________________________________ Date: __________ Time: ___________    Statement of Physician  My signature below affirms that prior to the time of the procedure, I have explained to the patient and/or his legal representative, the risks and benefits involved in the proposed treatment and any reasonable alternative to the proposed treatment. I have also explained the risks and benefits involved in the refusal of the proposed treatment and have answered the patient's questions. If I have a significant financial interest in this procedure/surgery, I have disclosed this and had a discussion with my patient.     Signature of Physician:   ________________________________________Date: _________Time:_______ Patient Name: Dianna Lopez  : 1970   Printed: 2022    Medical Record #: [id number]